# Patient Record
Sex: FEMALE | Race: BLACK OR AFRICAN AMERICAN | Employment: OTHER | URBAN - METROPOLITAN AREA
[De-identification: names, ages, dates, MRNs, and addresses within clinical notes are randomized per-mention and may not be internally consistent; named-entity substitution may affect disease eponyms.]

---

## 2017-10-24 ENCOUNTER — HOSPITAL ENCOUNTER (OUTPATIENT)
Dept: PHYSICAL THERAPY | Age: 72
Discharge: HOME | End: 2017-10-24
Payer: MEDICARE

## 2017-10-24 PROCEDURE — G8982 BODY POS GOAL STATUS: HCPCS

## 2017-10-24 PROCEDURE — 97163 PT EVAL HIGH COMPLEX 45 MIN: CPT

## 2017-10-24 PROCEDURE — G8981 BODY POS CURRENT STATUS: HCPCS
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
## 2017-10-26 NOTE — PROGRESS NOTES
Ambulatory/Rehab Services H2 Model Falls Risk Assessment    Risk Factor Pts. ·   Confusion/Disorientation/Impulsivity  []    4 ·   Symptomatic Depression  []   2 ·   Altered Elimination  []   1 ·   Dizziness/Vertigo  []   1 ·   Gender (Male)  []   1 ·   Any administered antiepileptics (anticonvulsants):  []   2 ·   Any administered benzodiazepines:  []   1 ·   Visual Impairment (specify):  []   1 ·   Portable Oxygen Use  []   1 ·   Orthostatic ? BP  []   1 ·   History of Recent Falls (within 3 mos.)  []   5     Ability to Rise from Chair (choose one) Pts. ·   Ability to rise in a single movement  []   0 ·   Pushes up, successful in one attempt  [x]   1 ·   Multiple attempts, but successful  []   3 ·   Unable to rise without assistance  []   4   Total: (5 or greater = High Risk) 1     Falls Prevention Plan:   []                Physical Limitations to Exercise (specify):   []                Mobility Assistance Device (type):   []                Exercise/Equipment Adaptation (specify):    ©2010 [facility] [address] Patent #[id number].  Federal Law prohibits the replication, distribution or use without written permission from [facility] [employer]

## 2017-10-27 ENCOUNTER — HOSPITAL ENCOUNTER (OUTPATIENT)
Dept: PHYSICAL THERAPY | Age: 72
Discharge: HOME | End: 2017-10-27
Payer: MEDICARE

## 2017-10-27 PROCEDURE — 97113 AQUATIC THERAPY/EXERCISES: CPT
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
## 2017-10-30 ENCOUNTER — HOSPITAL ENCOUNTER (OUTPATIENT)
Dept: PHYSICAL THERAPY | Age: 72
Discharge: HOME | End: 2017-10-30
Payer: MEDICARE

## 2017-10-30 PROCEDURE — 97113 AQUATIC THERAPY/EXERCISES: CPT
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
## 2017-11-01 ENCOUNTER — HOSPITAL ENCOUNTER (OUTPATIENT)
Dept: PHYSICAL THERAPY | Age: 72
Discharge: HOME | End: 2017-11-01
Payer: MEDICARE

## 2017-11-01 PROCEDURE — 97113 AQUATIC THERAPY/EXERCISES: CPT
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
## 2017-11-06 ENCOUNTER — HOSPITAL ENCOUNTER (OUTPATIENT)
Dept: PHYSICAL THERAPY | Age: 72
Discharge: HOME | End: 2017-11-06
Payer: MEDICARE

## 2017-11-06 PROCEDURE — 97113 AQUATIC THERAPY/EXERCISES: CPT
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
## 2017-11-08 ENCOUNTER — HOSPITAL ENCOUNTER (OUTPATIENT)
Dept: PHYSICAL THERAPY | Age: 72
Discharge: HOME | End: 2017-11-08
Payer: MEDICARE

## 2017-11-08 PROCEDURE — 97113 AQUATIC THERAPY/EXERCISES: CPT
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
## 2017-11-13 ENCOUNTER — HOSPITAL ENCOUNTER (OUTPATIENT)
Dept: PHYSICAL THERAPY | Age: 72
Discharge: HOME | End: 2017-11-13
Payer: MEDICARE

## 2017-11-13 PROCEDURE — 97113 AQUATIC THERAPY/EXERCISES: CPT
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
## 2017-11-15 ENCOUNTER — APPOINTMENT (OUTPATIENT)
Dept: PHYSICAL THERAPY | Age: 72
End: 2017-11-15
Payer: MEDICARE

## 2017-11-17 ENCOUNTER — HOSPITAL ENCOUNTER (OUTPATIENT)
Dept: PHYSICAL THERAPY | Age: 72
Discharge: HOME | End: 2017-11-17
Payer: MEDICARE

## 2017-11-17 PROCEDURE — 97110 THERAPEUTIC EXERCISES: CPT

## 2017-11-17 PROCEDURE — G8978 MOBILITY CURRENT STATUS: HCPCS

## 2017-11-17 PROCEDURE — G8979 MOBILITY GOAL STATUS: HCPCS
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
## 2017-11-28 ENCOUNTER — HOSPITAL ENCOUNTER (OUTPATIENT)
Dept: PHYSICAL THERAPY | Age: 72
Discharge: HOME | End: 2017-11-28
Payer: MEDICARE

## 2017-11-28 PROCEDURE — 97113 AQUATIC THERAPY/EXERCISES: CPT
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
## 2017-11-30 ENCOUNTER — HOSPITAL ENCOUNTER (OUTPATIENT)
Dept: PHYSICAL THERAPY | Age: 72
Discharge: HOME | End: 2017-11-30
Payer: MEDICARE

## 2017-11-30 PROCEDURE — 97140 MANUAL THERAPY 1/> REGIONS: CPT

## 2017-11-30 PROCEDURE — 97110 THERAPEUTIC EXERCISES: CPT
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
## 2017-12-05 ENCOUNTER — HOSPITAL ENCOUNTER (OUTPATIENT)
Dept: PHYSICAL THERAPY | Age: 72
Discharge: HOME | End: 2017-12-05
Payer: MEDICARE

## 2017-12-05 PROCEDURE — 97113 AQUATIC THERAPY/EXERCISES: CPT
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
## 2017-12-07 ENCOUNTER — HOSPITAL ENCOUNTER (OUTPATIENT)
Dept: PHYSICAL THERAPY | Age: 72
Discharge: HOME | End: 2017-12-07
Payer: MEDICARE

## 2017-12-07 PROCEDURE — 97110 THERAPEUTIC EXERCISES: CPT

## 2017-12-07 PROCEDURE — 97140 MANUAL THERAPY 1/> REGIONS: CPT
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
## 2017-12-14 ENCOUNTER — HOSPITAL ENCOUNTER (OUTPATIENT)
Dept: PHYSICAL THERAPY | Age: 72
Discharge: HOME | End: 2017-12-14
Payer: MEDICARE

## 2017-12-14 PROCEDURE — 97140 MANUAL THERAPY 1/> REGIONS: CPT

## 2017-12-14 PROCEDURE — 97110 THERAPEUTIC EXERCISES: CPT
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
## 2017-12-19 ENCOUNTER — HOSPITAL ENCOUNTER (OUTPATIENT)
Dept: PHYSICAL THERAPY | Age: 72
Discharge: HOME | End: 2017-12-19
Payer: MEDICARE

## 2017-12-19 PROCEDURE — 97110 THERAPEUTIC EXERCISES: CPT

## 2017-12-19 PROCEDURE — G8978 MOBILITY CURRENT STATUS: HCPCS

## 2017-12-19 PROCEDURE — G8979 MOBILITY GOAL STATUS: HCPCS
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
## 2017-12-26 ENCOUNTER — APPOINTMENT (OUTPATIENT)
Dept: PHYSICAL THERAPY | Age: 72
End: 2017-12-26
Payer: MEDICARE

## 2017-12-26 PROBLEM — R07.2 PRECORDIAL PAIN: Status: ACTIVE | Noted: 2017-12-26

## 2017-12-26 PROBLEM — I10 HYPERTENSION: Status: ACTIVE | Noted: 2017-12-26

## 2017-12-28 PROBLEM — E78.00 PURE HYPERCHOLESTEROLEMIA: Status: ACTIVE | Noted: 2017-12-28

## 2017-12-28 PROBLEM — R06.02 SHORTNESS OF BREATH: Status: ACTIVE | Noted: 2017-12-28

## 2018-01-02 ENCOUNTER — APPOINTMENT (OUTPATIENT)
Dept: PHYSICAL THERAPY | Age: 73
End: 2018-01-02
Payer: MEDICARE

## 2018-01-04 ENCOUNTER — HOSPITAL ENCOUNTER (OUTPATIENT)
Dept: PHYSICAL THERAPY | Age: 73
Discharge: HOME | End: 2018-01-04
Payer: MEDICARE

## 2018-01-04 PROCEDURE — 97110 THERAPEUTIC EXERCISES: CPT

## 2018-01-04 PROCEDURE — 97140 MANUAL THERAPY 1/> REGIONS: CPT
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
## 2018-01-09 ENCOUNTER — HOSPITAL ENCOUNTER (OUTPATIENT)
Dept: PHYSICAL THERAPY | Age: 73
Discharge: HOME | End: 2018-01-09
Payer: MEDICARE

## 2018-01-09 PROCEDURE — 97110 THERAPEUTIC EXERCISES: CPT
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
## 2018-01-16 ENCOUNTER — HOSPITAL ENCOUNTER (OUTPATIENT)
Dept: PHYSICAL THERAPY | Age: 73
Discharge: HOME | End: 2018-01-16
Payer: MEDICARE

## 2018-01-16 PROCEDURE — G8978 MOBILITY CURRENT STATUS: HCPCS

## 2018-01-16 PROCEDURE — G8979 MOBILITY GOAL STATUS: HCPCS

## 2018-01-16 PROCEDURE — 97110 THERAPEUTIC EXERCISES: CPT
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
## 2018-01-23 ENCOUNTER — APPOINTMENT (OUTPATIENT)
Dept: PHYSICAL THERAPY | Age: 73
End: 2018-01-23
Payer: MEDICARE

## 2019-01-06 ENCOUNTER — APPOINTMENT (OUTPATIENT)
Dept: GENERAL RADIOLOGY | Age: 74
End: 2019-01-06
Attending: EMERGENCY MEDICINE
Payer: MEDICARE

## 2019-01-06 ENCOUNTER — HOSPITAL ENCOUNTER (EMERGENCY)
Age: 74
Discharge: HOME | End: 2019-01-06
Attending: EMERGENCY MEDICINE
Payer: MEDICARE

## 2019-01-06 ENCOUNTER — APPOINTMENT (OUTPATIENT)
Dept: GENERAL RADIOLOGY | Age: 74
End: 2019-01-06
Attending: NURSE PRACTITIONER
Payer: MEDICARE

## 2019-01-06 VITALS
DIASTOLIC BLOOD PRESSURE: 75 MMHG | RESPIRATION RATE: 16 BRPM | BODY MASS INDEX: 27.23 KG/M2 | HEART RATE: 93 BPM | WEIGHT: 148 LBS | SYSTOLIC BLOOD PRESSURE: 148 MMHG | OXYGEN SATURATION: 99 % | TEMPERATURE: 98.5 F | HEIGHT: 62 IN

## 2019-01-06 DIAGNOSIS — M79.10 MYALGIA: ICD-10-CM
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
## 2019-01-06 PROCEDURE — 99283 EMERGENCY DEPT VISIT LOW MDM: CPT | Performed by: NURSE PRACTITIONER

## 2019-01-06 PROCEDURE — 74011250637 HC RX REV CODE- 250/637: Performed by: NURSE PRACTITIONER

## 2019-01-06 PROCEDURE — 72100 X-RAY EXAM L-S SPINE 2/3 VWS: CPT

## 2019-01-06 PROCEDURE — 73130 X-RAY EXAM OF HAND: CPT

## 2019-01-06 PROCEDURE — 73562 X-RAY EXAM OF KNEE 3: CPT

## 2019-01-06 PROCEDURE — 72070 X-RAY EXAM THORAC SPINE 2VWS: CPT

## 2019-01-06 RX ORDER — HYDROCODONE BITARTRATE AND ACETAMINOPHEN 5; 325 MG/1; MG/1
1 TABLET ORAL ONCE
Status: COMPLETED | OUTPATIENT
Start: 2019-01-06 | End: 2019-01-06

## 2019-01-06 RX ORDER — LIDOCAINE 50 MG/G
PATCH TOPICAL
Qty: 10 EACH | Refills: 0 | Status: SHIPPED | OUTPATIENT
Start: 2019-01-06 | End: 2019-02-15 | Stop reason: ALTCHOICE

## 2019-01-06 RX ADMIN — HYDROCODONE BITARTRATE AND ACETAMINOPHEN 1 TABLET: 5; 325 TABLET ORAL at 18:07
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
## 2019-01-06 NOTE — ED NOTES
I have reviewed discharge instructions with the patient. The patient verbalized understanding. Patient left ED via Discharge Method: ambulatory to Home with her daughter that was at bedside. Opportunity for questions and clarification provided. Patient given 1 scripts. To continue your aftercare when you leave the hospital, you may receive an automated call from our care team to check in on how you are doing. This is a free service and part of our promise to provide the best care and service to meet your aftercare needs.  If you have questions, or wish to unsubscribe from this service please call [phone]. Thank you for Choosing our [facility] Emergency Department.
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
## 2019-10-09 ENCOUNTER — HOSPITAL ENCOUNTER (OUTPATIENT)
Age: 74
Discharge: HOME | DRG: 392 | End: 2019-10-09
Attending: EMERGENCY MEDICINE | Admitting: INTERNAL MEDICINE
Payer: MEDICARE

## 2019-10-09 ENCOUNTER — APPOINTMENT (OUTPATIENT)
Dept: CT IMAGING | Age: 74
DRG: 392 | End: 2019-10-09
Attending: EMERGENCY MEDICINE
Payer: MEDICARE

## 2019-10-09 ENCOUNTER — HOSPITAL ENCOUNTER (INPATIENT)
Age: 74
LOS: 4 days | Discharge: HOME | DRG: 392 | End: 2019-10-13
Attending: FAMILY MEDICINE | Admitting: INTERNAL MEDICINE
Payer: MEDICARE

## 2019-10-09 VITALS
BODY MASS INDEX: 27.97 KG/M2 | WEIGHT: 152 LBS | OXYGEN SATURATION: 97 % | DIASTOLIC BLOOD PRESSURE: 84 MMHG | HEART RATE: 80 BPM | HEIGHT: 62 IN | TEMPERATURE: 98.7 F | RESPIRATION RATE: 18 BRPM | SYSTOLIC BLOOD PRESSURE: 183 MMHG

## 2019-10-09 DIAGNOSIS — K57.20 COLONIC DIVERTICULAR ABSCESS: ICD-10-CM

## 2019-10-09 DIAGNOSIS — K57.20 DIVERTICULITIS OF LARGE INTESTINE WITH ABSCESS WITHOUT BLEEDING: Primary | ICD-10-CM

## 2019-10-09 DIAGNOSIS — K57.92 DIVERTICULITIS: Primary | ICD-10-CM

## 2019-10-09 DIAGNOSIS — R10.31 RLQ ABDOMINAL PAIN: ICD-10-CM

## 2019-10-09 PROBLEM — K57.80 DIVERTICULITIS OF INTESTINE WITH ABSCESS: Status: ACTIVE | Noted: 2019-10-09

## 2019-10-09 LAB
ALBUMIN SERPL-MCNC: 2.9 G/DL (ref 3.2–4.6)
ALBUMIN/GLOB SERPL: 0.9 {RATIO} (ref 1.2–3.5)
ALP SERPL-CCNC: 50 U/L (ref 50–136)
ALT SERPL-CCNC: 24 U/L (ref 12–65)
ANION GAP SERPL CALC-SCNC: 4 MMOL/L (ref 7–16)
AST SERPL-CCNC: 19 U/L (ref 15–37)
BACTERIA URNS QL MICRO: 0 /HPF
BASOPHILS # BLD: 0 K/UL (ref 0–0.2)
BASOPHILS NFR BLD: 0 % (ref 0–2)
BILIRUB SERPL-MCNC: 0.2 MG/DL (ref 0.2–1.1)
BUN SERPL-MCNC: 29 MG/DL (ref 8–23)
CALCIUM SERPL-MCNC: 8.4 MG/DL (ref 8.3–10.4)
CASTS URNS QL MICRO: 0 /LPF
CHLORIDE SERPL-SCNC: 109 MMOL/L (ref 98–107)
CO2 SERPL-SCNC: 29 MMOL/L (ref 21–32)
CREAT SERPL-MCNC: 0.95 MG/DL (ref 0.6–1)
CRYSTALS URNS QL MICRO: 0 /LPF
DIFFERENTIAL METHOD BLD: ABNORMAL
EOSINOPHIL # BLD: 0.1 K/UL (ref 0–0.8)
EOSINOPHIL NFR BLD: 1 % (ref 0.5–7.8)
EPI CELLS #/AREA URNS HPF: NORMAL /HPF
ERYTHROCYTE [DISTWIDTH] IN BLOOD BY AUTOMATED COUNT: 15.5 % (ref 11.9–14.6)
GLOBULIN SER CALC-MCNC: 3.4 G/DL (ref 2.3–3.5)
GLUCOSE SERPL-MCNC: 122 MG/DL (ref 65–100)
HCT VFR BLD AUTO: 37.4 % (ref 35.8–46.3)
HGB BLD-MCNC: 11.7 G/DL (ref 11.7–15.4)
IMM GRANULOCYTES # BLD AUTO: 0 K/UL (ref 0–0.5)
IMM GRANULOCYTES NFR BLD AUTO: 0 % (ref 0–5)
LIPASE SERPL-CCNC: 140 U/L (ref 73–393)
LYMPHOCYTES # BLD: 2 K/UL (ref 0.5–4.6)
LYMPHOCYTES NFR BLD: 19 % (ref 13–44)
MCH RBC QN AUTO: 31.8 PG (ref 26.1–32.9)
MCHC RBC AUTO-ENTMCNC: 31.3 G/DL (ref 31.4–35)
MCV RBC AUTO: 101.6 FL (ref 79.6–97.8)
MONOCYTES # BLD: 1 K/UL (ref 0.1–1.3)
MONOCYTES NFR BLD: 9 % (ref 4–12)
MUCOUS THREADS URNS QL MICRO: 0 /LPF
NEUTS SEG # BLD: 7.3 K/UL (ref 1.7–8.2)
NEUTS SEG NFR BLD: 71 % (ref 43–78)
NRBC # BLD: 0 K/UL (ref 0–0.2)
PLATELET # BLD AUTO: 196 K/UL (ref 150–450)
PMV BLD AUTO: 9.3 FL (ref 9.4–12.3)
POTASSIUM SERPL-SCNC: 3.8 MMOL/L (ref 3.5–5.1)
PROT SERPL-MCNC: 6.3 G/DL (ref 6.3–8.2)
RBC # BLD AUTO: 3.68 M/UL (ref 4.05–5.2)
RBC #/AREA URNS HPF: NORMAL /HPF
SODIUM SERPL-SCNC: 142 MMOL/L (ref 136–145)
WBC # BLD AUTO: 10.3 K/UL (ref 4.3–11.1)
WBC URNS QL MICRO: NORMAL /HPF

## 2019-10-09 PROCEDURE — 74011250636 HC RX REV CODE- 250/636: Performed by: INTERNAL MEDICINE

## 2019-10-09 PROCEDURE — 80053 COMPREHEN METABOLIC PANEL: CPT

## 2019-10-09 PROCEDURE — 96375 TX/PRO/DX INJ NEW DRUG ADDON: CPT | Performed by: EMERGENCY MEDICINE

## 2019-10-09 PROCEDURE — 87040 BLOOD CULTURE FOR BACTERIA: CPT

## 2019-10-09 PROCEDURE — 74011636320 HC RX REV CODE- 636/320: Performed by: EMERGENCY MEDICINE

## 2019-10-09 PROCEDURE — 74011000258 HC RX REV CODE- 258: Performed by: INTERNAL MEDICINE

## 2019-10-09 PROCEDURE — 83690 ASSAY OF LIPASE: CPT

## 2019-10-09 PROCEDURE — 36415 COLL VENOUS BLD VENIPUNCTURE: CPT

## 2019-10-09 PROCEDURE — 85025 COMPLETE CBC W/AUTO DIFF WBC: CPT

## 2019-10-09 PROCEDURE — 96361 HYDRATE IV INFUSION ADD-ON: CPT | Performed by: EMERGENCY MEDICINE

## 2019-10-09 PROCEDURE — 65270000029 HC RM PRIVATE

## 2019-10-09 PROCEDURE — 96374 THER/PROPH/DIAG INJ IV PUSH: CPT | Performed by: EMERGENCY MEDICINE

## 2019-10-09 PROCEDURE — 74177 CT ABD & PELVIS W/CONTRAST: CPT

## 2019-10-09 PROCEDURE — 74011250637 HC RX REV CODE- 250/637: Performed by: INTERNAL MEDICINE

## 2019-10-09 PROCEDURE — 74011000258 HC RX REV CODE- 258: Performed by: EMERGENCY MEDICINE

## 2019-10-09 PROCEDURE — 99284 EMERGENCY DEPT VISIT MOD MDM: CPT | Performed by: EMERGENCY MEDICINE

## 2019-10-09 PROCEDURE — 74011250636 HC RX REV CODE- 250/636: Performed by: EMERGENCY MEDICINE

## 2019-10-09 PROCEDURE — 81015 MICROSCOPIC EXAM OF URINE: CPT

## 2019-10-09 RX ORDER — AMLODIPINE BESYLATE 10 MG/1
10 TABLET ORAL DAILY
Status: DISCONTINUED | OUTPATIENT
Start: 2019-10-10 | End: 2019-10-13 | Stop reason: HOSPADM

## 2019-10-09 RX ORDER — SODIUM CHLORIDE 9 MG/ML
75 INJECTION, SOLUTION INTRAVENOUS CONTINUOUS
Status: CANCELLED | OUTPATIENT
Start: 2019-10-09

## 2019-10-09 RX ORDER — AMLODIPINE BESYLATE 10 MG/1
10 TABLET ORAL DAILY
Status: CANCELLED | OUTPATIENT
Start: 2019-10-10

## 2019-10-09 RX ORDER — ONDANSETRON 2 MG/ML
4 INJECTION INTRAMUSCULAR; INTRAVENOUS
Status: CANCELLED | OUTPATIENT
Start: 2019-10-09

## 2019-10-09 RX ORDER — SODIUM CHLORIDE 0.9 % (FLUSH) 0.9 %
5-40 SYRINGE (ML) INJECTION AS NEEDED
Status: CANCELLED | OUTPATIENT
Start: 2019-10-09

## 2019-10-09 RX ORDER — MORPHINE SULFATE 2 MG/ML
4 INJECTION, SOLUTION INTRAMUSCULAR; INTRAVENOUS ONCE
Status: COMPLETED | OUTPATIENT
Start: 2019-10-09 | End: 2019-10-09

## 2019-10-09 RX ORDER — ONDANSETRON 2 MG/ML
4 INJECTION INTRAMUSCULAR; INTRAVENOUS
Status: DISCONTINUED | OUTPATIENT
Start: 2019-10-09 | End: 2019-10-13 | Stop reason: HOSPADM

## 2019-10-09 RX ORDER — HEPARIN SODIUM 5000 [USP'U]/ML
5000 INJECTION, SOLUTION INTRAVENOUS; SUBCUTANEOUS EVERY 8 HOURS
Status: DISCONTINUED | OUTPATIENT
Start: 2019-10-09 | End: 2019-10-13 | Stop reason: HOSPADM

## 2019-10-09 RX ORDER — SODIUM CHLORIDE 0.9 % (FLUSH) 0.9 %
5-40 SYRINGE (ML) INJECTION AS NEEDED
Status: DISCONTINUED | OUTPATIENT
Start: 2019-10-09 | End: 2019-10-13 | Stop reason: HOSPADM

## 2019-10-09 RX ORDER — PANTOPRAZOLE SODIUM 40 MG/1
40 TABLET, DELAYED RELEASE ORAL
Status: DISCONTINUED | OUTPATIENT
Start: 2019-10-10 | End: 2019-10-10

## 2019-10-09 RX ORDER — CARVEDILOL 6.25 MG/1
12.5 TABLET ORAL 2 TIMES DAILY WITH MEALS
Status: CANCELLED | OUTPATIENT
Start: 2019-10-10

## 2019-10-09 RX ORDER — METRONIDAZOLE 500 MG/100ML
500 INJECTION, SOLUTION INTRAVENOUS
Status: COMPLETED | OUTPATIENT
Start: 2019-10-09 | End: 2019-10-09

## 2019-10-09 RX ORDER — SODIUM CHLORIDE 0.9 % (FLUSH) 0.9 %
5-40 SYRINGE (ML) INJECTION EVERY 8 HOURS
Status: DISCONTINUED | OUTPATIENT
Start: 2019-10-09 | End: 2019-10-13 | Stop reason: HOSPADM

## 2019-10-09 RX ORDER — PRAVASTATIN SODIUM 20 MG/1
40 TABLET ORAL
Status: DISCONTINUED | OUTPATIENT
Start: 2019-10-09 | End: 2019-10-10

## 2019-10-09 RX ORDER — MORPHINE SULFATE 2 MG/ML
2 INJECTION, SOLUTION INTRAMUSCULAR; INTRAVENOUS ONCE
Status: COMPLETED | OUTPATIENT
Start: 2019-10-09 | End: 2019-10-09

## 2019-10-09 RX ORDER — PANTOPRAZOLE SODIUM 40 MG/1
40 TABLET, DELAYED RELEASE ORAL
Status: CANCELLED | OUTPATIENT
Start: 2019-10-10

## 2019-10-09 RX ORDER — METRONIDAZOLE 500 MG/100ML
500 INJECTION, SOLUTION INTRAVENOUS EVERY 6 HOURS
Status: DISCONTINUED | OUTPATIENT
Start: 2019-10-10 | End: 2019-10-10

## 2019-10-09 RX ORDER — SODIUM CHLORIDE 0.9 % (FLUSH) 0.9 %
5-40 SYRINGE (ML) INJECTION EVERY 8 HOURS
Status: CANCELLED | OUTPATIENT
Start: 2019-10-09

## 2019-10-09 RX ORDER — CIPROFLOXACIN 2 MG/ML
400 INJECTION, SOLUTION INTRAVENOUS ONCE
Status: COMPLETED | OUTPATIENT
Start: 2019-10-09 | End: 2019-10-09

## 2019-10-09 RX ORDER — MORPHINE SULFATE 2 MG/ML
2 INJECTION, SOLUTION INTRAMUSCULAR; INTRAVENOUS
Status: DISCONTINUED | OUTPATIENT
Start: 2019-10-09 | End: 2019-10-13 | Stop reason: HOSPADM

## 2019-10-09 RX ORDER — HEPARIN SODIUM 5000 [USP'U]/ML
5000 INJECTION, SOLUTION INTRAVENOUS; SUBCUTANEOUS EVERY 8 HOURS
Status: CANCELLED | OUTPATIENT
Start: 2019-10-09

## 2019-10-09 RX ORDER — ASPIRIN 81 MG/1
81 TABLET ORAL DAILY
Status: DISCONTINUED | OUTPATIENT
Start: 2019-10-10 | End: 2019-10-10

## 2019-10-09 RX ORDER — LEVOTHYROXINE SODIUM 100 UG/1
100 TABLET ORAL
Status: DISCONTINUED | OUTPATIENT
Start: 2019-10-10 | End: 2019-10-13 | Stop reason: HOSPADM

## 2019-10-09 RX ORDER — METRONIDAZOLE 500 MG/100ML
500 INJECTION, SOLUTION INTRAVENOUS EVERY 6 HOURS
Status: CANCELLED | OUTPATIENT
Start: 2019-10-09

## 2019-10-09 RX ORDER — HYDROCHLOROTHIAZIDE 25 MG/1
25 TABLET ORAL DAILY
Status: CANCELLED | OUTPATIENT
Start: 2019-10-10

## 2019-10-09 RX ORDER — ONDANSETRON 2 MG/ML
4 INJECTION INTRAMUSCULAR; INTRAVENOUS
Status: COMPLETED | OUTPATIENT
Start: 2019-10-09 | End: 2019-10-09

## 2019-10-09 RX ORDER — HYDROCHLOROTHIAZIDE 25 MG/1
25 TABLET ORAL DAILY
Status: DISCONTINUED | OUTPATIENT
Start: 2019-10-10 | End: 2019-10-10

## 2019-10-09 RX ORDER — ACETAMINOPHEN 325 MG/1
650 TABLET ORAL
Status: CANCELLED | OUTPATIENT
Start: 2019-10-09

## 2019-10-09 RX ORDER — CARVEDILOL 3.12 MG/1
3.12 TABLET ORAL 2 TIMES DAILY WITH MEALS
Status: DISCONTINUED | OUTPATIENT
Start: 2019-10-10 | End: 2019-10-13 | Stop reason: HOSPADM

## 2019-10-09 RX ORDER — HYDRALAZINE HYDROCHLORIDE 20 MG/ML
10 INJECTION INTRAMUSCULAR; INTRAVENOUS
Status: DISCONTINUED | OUTPATIENT
Start: 2019-10-09 | End: 2019-10-13 | Stop reason: HOSPADM

## 2019-10-09 RX ORDER — SODIUM CHLORIDE 9 MG/ML
75 INJECTION, SOLUTION INTRAVENOUS CONTINUOUS
Status: DISCONTINUED | OUTPATIENT
Start: 2019-10-09 | End: 2019-10-10

## 2019-10-09 RX ORDER — ACETAMINOPHEN 325 MG/1
650 TABLET ORAL
Status: DISCONTINUED | OUTPATIENT
Start: 2019-10-09 | End: 2019-10-13 | Stop reason: HOSPADM

## 2019-10-09 RX ORDER — SODIUM CHLORIDE 0.9 % (FLUSH) 0.9 %
10 SYRINGE (ML) INJECTION
Status: COMPLETED | OUTPATIENT
Start: 2019-10-09 | End: 2019-10-09

## 2019-10-09 RX ORDER — PRAVASTATIN SODIUM 20 MG/1
40 TABLET ORAL
Status: CANCELLED | OUTPATIENT
Start: 2019-10-09

## 2019-10-09 RX ADMIN — ACETAMINOPHEN 650 MG: 325 TABLET, FILM COATED ORAL at 23:11

## 2019-10-09 RX ADMIN — ONDANSETRON 4 MG: 2 INJECTION INTRAMUSCULAR; INTRAVENOUS at 16:27

## 2019-10-09 RX ADMIN — MORPHINE SULFATE 2 MG: 2 INJECTION, SOLUTION INTRAMUSCULAR; INTRAVENOUS at 16:29

## 2019-10-09 RX ADMIN — IOPAMIDOL 100 ML: 755 INJECTION, SOLUTION INTRAVENOUS at 17:46

## 2019-10-09 RX ADMIN — Medication 10 ML: at 23:00

## 2019-10-09 RX ADMIN — SODIUM CHLORIDE 75 ML/HR: 900 INJECTION, SOLUTION INTRAVENOUS at 23:02

## 2019-10-09 RX ADMIN — ONDANSETRON 4 MG: 2 INJECTION INTRAMUSCULAR; INTRAVENOUS at 18:51

## 2019-10-09 RX ADMIN — Medication 10 ML: at 17:46

## 2019-10-09 RX ADMIN — METRONIDAZOLE 500 MG: 500 INJECTION, SOLUTION INTRAVENOUS at 18:52

## 2019-10-09 RX ADMIN — CEFTRIAXONE 1 G: 1 INJECTION, POWDER, FOR SOLUTION INTRAMUSCULAR; INTRAVENOUS at 23:01

## 2019-10-09 RX ADMIN — SODIUM CHLORIDE 100 ML: 900 INJECTION, SOLUTION INTRAVENOUS at 17:46

## 2019-10-09 RX ADMIN — MORPHINE SULFATE 4 MG: 2 INJECTION, SOLUTION INTRAMUSCULAR; INTRAVENOUS at 18:51

## 2019-10-09 RX ADMIN — CIPROFLOXACIN 400 MG: 2 INJECTION, SOLUTION INTRAVENOUS at 20:20

## 2019-10-09 RX ADMIN — PRAVASTATIN SODIUM 40 MG: 20 TABLET ORAL at 23:01

## 2019-10-09 RX ADMIN — SODIUM CHLORIDE 1000 ML: 900 INJECTION, SOLUTION INTRAVENOUS at 16:26

## 2019-10-09 NOTE — ED TRIAGE NOTES
Patient reports stabbing pain to lower abdomen into vagina. Pain worse with urination. Denies blood in urine. Seen at , given antibiotic and fluids, per patient.

## 2019-10-09 NOTE — ED PROVIDER NOTES
79-year-old female presents with complaint of cramping/sharp right lower quadrant pain that is been present since Thursday/Friday. Patient states that she was seen at  on 10/07. Patient had basic blood work, urinalysis, and x-ray KUB. UA with 1-3 WBCs and 3+ bacteria. Patient given Rocephin in office and discharged home with antibiotic. Patient states that symptoms have not improved. Patient reports worsening pain and nausea. Patient reports mild constipation however she had a normal bowel movement earlier this morning. Patient denies diarrhea, fever, chills, melena, hematochezia, vaginal bleeding, vaginal discharge. Patient reports history of previous hysterectomy and appendectomy. The history is provided by the patient. No  was used. Abdominal Pain    This is a new problem. The current episode started more than 2 days ago. The problem occurs constantly. The problem has not changed since onset. The pain is located in the RLQ. The quality of the pain is cramping and sharp. The pain is at a severity of 4/10. The pain is mild. Associated symptoms include nausea, constipation and dysuria. Pertinent negatives include no anorexia, no fever, no belching, no diarrhea, no flatus, no hematochezia, no melena, no vomiting, no frequency, no hematuria, no headaches, no arthralgias, no myalgias, no trauma, no chest pain and no back pain. The pain is worsened by palpation. The pain is relieved by nothing.         Past Medical History:   Diagnosis Date    Arthritis     Hypercholesteremia     Hypertension     Multinodular goiter     Postsurgical hypothyroidism        Past Surgical History:   Procedure Laterality Date    HX CARPAL TUNNEL RELEASE Bilateral     HX HYSTERECTOMY      HX KNEE REPLACEMENT Bilateral     HX OOPHORECTOMY Bilateral     HX ROTATOR CUFF REPAIR Left     HX SHOULDER REPLACEMENT Right     HX THYROIDECTOMY  ~1987         Family History:   Problem Relation Age of Onset    Sudden Death Mother     Coronary Artery Disease Mother     Coronary Artery Disease Father     Coronary Artery Disease Maternal Aunt     Diabetes Maternal Aunt     Coronary Artery Disease Maternal Uncle     Diabetes Maternal Uncle     Coronary Artery Disease Maternal Grandmother     Diabetes Maternal Grandmother     Thyroid Disease Sister     Diabetes Maternal Aunt     Diabetes Maternal Aunt     Diabetes Maternal Aunt     Diabetes Maternal Uncle     Cancer Neg Hx     Thyroid Cancer Neg Hx        Social History     Socioeconomic History    Marital status:      Spouse name: Not on file    Number of children: Not on file    Years of education: Not on file    Highest education level: Not on file   Occupational History    Not on file   Social Needs    Financial resource strain: Not on file    Food insecurity:     Worry: Not on file     Inability: Not on file    Transportation needs:     Medical: Not on file     Non-medical: Not on file   Tobacco Use    Smoking status: Current Every Day Smoker     Packs/day: 0.50    Smokeless tobacco: Never Used   Substance and Sexual Activity    Alcohol use: No    Drug use: No    Sexual activity: Never   Lifestyle    Physical activity:     Days per week: Not on file     Minutes per session: Not on file    Stress: Not on file   Relationships    Social connections:     Talks on phone: Not on file     Gets together: Not on file     Attends Buddhism service: Not on file     Active member of club or organization: Not on file     Attends meetings of clubs or organizations: Not on file     Relationship status: Not on file    Intimate partner violence:     Fear of current or ex partner: Not on file     Emotionally abused: Not on file     Physically abused: Not on file     Forced sexual activity: Not on file   Other Topics Concern    Not on file   Social History Narrative    Not on file         ALLERGIES: Aspirin; Ciprofloxacin;  Influenza virus vaccines; Lisinopril; and Sulfa (sulfonamide antibiotics)    Review of Systems   Constitutional: Negative for fatigue and fever. Respiratory: Negative for shortness of breath. Cardiovascular: Negative for chest pain. Gastrointestinal: Positive for abdominal pain, constipation and nausea. Negative for anorexia, diarrhea, flatus, hematochezia, melena, rectal pain and vomiting. Genitourinary: Positive for dysuria. Negative for flank pain, frequency, hematuria, vaginal bleeding and vaginal discharge. Musculoskeletal: Negative for arthralgias, back pain and myalgias. Skin: Negative for rash. Neurological: Negative for dizziness, light-headedness and headaches. Vitals:    10/09/19 1533   BP: 153/86   Pulse: 92   Resp: 16   Temp: 97.8 °F (36.6 °C)   SpO2: 99%   Weight: 68.9 kg (152 lb)   Height: 5' 2\" (1.575 m)            Physical Exam   Constitutional: She is oriented to person, place, and time. She appears well-developed and well-nourished. Well appearing and in NAD. HENT:   Mouth/Throat: Oropharynx is clear and moist.   MMM. Eyes: Pupils are equal, round, and reactive to light. Conjunctivae are normal.   Neck: No JVD present. No tracheal deviation present. Cardiovascular: Normal rate, regular rhythm, normal heart sounds and intact distal pulses. Pulmonary/Chest: Effort normal and breath sounds normal.   Abdominal: Soft. Bowel sounds are normal. There is tenderness in the right lower quadrant. There is no rebound, no guarding and negative Pate's sign. Mild RLQ TTP. Soft, ND. No rebound or guarding. No CVAT. Musculoskeletal: Normal range of motion. Neurological: She is alert and oriented to person, place, and time. No cranial nerve deficit. Strength 5/5 throughout. Normal sensory. Skin: Skin is warm and dry. No rash. Nursing note and vitals reviewed.        MDM  Number of Diagnoses or Management Options  Colonic diverticular abscess: new and requires workup  Diverticulitis: new and requires workup  RLQ abdominal pain: new and requires workup  Diagnosis management comments: CT w/ findings of diverticulitis with focal perforation and small abscess. Orders placed for IV Cipro + Flagyl, pain control, Zofran 4 mg IV. UA negative. General Surgery consulted. Dr. Felix Tariq on call. Discussed case with him and requests Hospitalist admit and transfer DT for IR. Amount and/or Complexity of Data Reviewed  Clinical lab tests: ordered and reviewed  Tests in the radiology section of CPT®: ordered and reviewed  Tests in the medicine section of CPT®: ordered and reviewed  Review and summarize past medical records: yes  Discuss the patient with other providers: yes  Independent visualization of images, tracings, or specimens: yes    Risk of Complications, Morbidity, and/or Mortality  Presenting problems: moderate  Diagnostic procedures: moderate  Management options: moderate    Patient Progress  Patient progress: stable    ED Course as of Oct 09 1832   Wed Oct 09, 2019   1811 CT abd/pelvis IMPRESSION:     1.  Findings suspicious for an acute distal sigmoid diverticulitis probably  complicated by focal perforation and a small diverticular abscess. 2.  Other chronic findings as above.     [DF]      ED Course User Index  [DF] Daron Gardner MD       Procedures    Results Include:    Recent Results (from the past 24 hour(s))   URINE MICROSCOPIC    Collection Time: 10/09/19  3:52 PM   Result Value Ref Range    WBC 3-5 0 /hpf    RBC 0-3 0 /hpf    Epithelial cells 0-3 0 /hpf    Bacteria 0 0 /hpf    Casts 0 0 /lpf    Crystals, urine 0 0 /LPF    Mucus 0 0 /lpf   CBC WITH AUTOMATED DIFF    Collection Time: 10/09/19  4:08 PM   Result Value Ref Range    WBC 10.3 4.3 - 11.1 K/uL    RBC 3.68 (L) 4.05 - 5.2 M/uL    HGB 11.7 11.7 - 15.4 g/dL    HCT 37.4 35.8 - 46.3 %    .6 (H) 79.6 - 97.8 FL    MCH 31.8 26.1 - 32.9 PG    MCHC 31.3 (L) 31.4 - 35.0 g/dL    RDW 15.5 (H) 11.9 - 14.6 % PLATELET 156 975 - 204 K/uL    MPV 9.3 (L) 9.4 - 12.3 FL    ABSOLUTE NRBC 0.00 0.0 - 0.2 K/uL    DF AUTOMATED      NEUTROPHILS 71 43 - 78 %    LYMPHOCYTES 19 13 - 44 %    MONOCYTES 9 4.0 - 12.0 %    EOSINOPHILS 1 0.5 - 7.8 %    BASOPHILS 0 0.0 - 2.0 %    IMMATURE GRANULOCYTES 0 0.0 - 5.0 %    ABS. NEUTROPHILS 7.3 1.7 - 8.2 K/UL    ABS. LYMPHOCYTES 2.0 0.5 - 4.6 K/UL    ABS. MONOCYTES 1.0 0.1 - 1.3 K/UL    ABS. EOSINOPHILS 0.1 0.0 - 0.8 K/UL    ABS. BASOPHILS 0.0 0.0 - 0.2 K/UL    ABS. IMM. GRANS. 0.0 0.0 - 0.5 K/UL   LIPASE    Collection Time: 10/09/19  4:39 PM   Result Value Ref Range    Lipase 140 73 - 225 U/L   METABOLIC PANEL, COMPREHENSIVE    Collection Time: 10/09/19  4:39 PM   Result Value Ref Range    Sodium 142 136 - 145 mmol/L    Potassium 3.8 3.5 - 5.1 mmol/L    Chloride 109 (H) 98 - 107 mmol/L    CO2 29 21 - 32 mmol/L    Anion gap 4 (L) 7 - 16 mmol/L    Glucose 122 (H) 65 - 100 mg/dL    BUN 29 (H) 8 - 23 MG/DL    Creatinine 0.95 0.6 - 1.0 MG/DL    GFR est AA >60 >60 ml/min/1.73m2    GFR est non-AA >60 >60 ml/min/1.73m2    Calcium 8.4 8.3 - 10.4 MG/DL    Bilirubin, total 0.2 0.2 - 1.1 MG/DL    ALT (SGPT) 24 12 - 65 U/L    AST (SGOT) 19 15 - 37 U/L    Alk. phosphatase 50 50 - 136 U/L    Protein, total 6.3 6.3 - 8.2 g/dL    Albumin 2.9 (L) 3.2 - 4.6 g/dL    Globulin 3.4 2.3 - 3.5 g/dL    A-G Ratio 0.9 (L) 1.2 - 3.5           David Scott MD; 10/9/2019 @3:45 PM Voice dictation software was used during the making of this note. This software is not perfect and grammatical and other typographical errors may be present.   This note has not been proofread for errors.  ===================================================================

## 2019-10-10 LAB
ALBUMIN SERPL-MCNC: 2.9 G/DL (ref 3.2–4.6)
ALBUMIN/GLOB SERPL: 0.9 {RATIO} (ref 1.2–3.5)
ALP SERPL-CCNC: 60 U/L (ref 50–136)
ALT SERPL-CCNC: 23 U/L (ref 12–65)
ANION GAP SERPL CALC-SCNC: 2 MMOL/L (ref 7–16)
AST SERPL-CCNC: 14 U/L (ref 15–37)
BASOPHILS # BLD: 0 K/UL (ref 0–0.2)
BASOPHILS NFR BLD: 0 % (ref 0–2)
BILIRUB SERPL-MCNC: 0.4 MG/DL (ref 0.2–1.1)
BUN SERPL-MCNC: 19 MG/DL (ref 8–23)
CALCIUM SERPL-MCNC: 8.7 MG/DL (ref 8.3–10.4)
CHLORIDE SERPL-SCNC: 110 MMOL/L (ref 98–107)
CO2 SERPL-SCNC: 32 MMOL/L (ref 21–32)
CREAT SERPL-MCNC: 0.97 MG/DL (ref 0.6–1)
DIFFERENTIAL METHOD BLD: ABNORMAL
EOSINOPHIL # BLD: 0.1 K/UL (ref 0–0.8)
EOSINOPHIL NFR BLD: 1 % (ref 0.5–7.8)
ERYTHROCYTE [DISTWIDTH] IN BLOOD BY AUTOMATED COUNT: 15.4 % (ref 11.9–14.6)
GLOBULIN SER CALC-MCNC: 3.4 G/DL (ref 2.3–3.5)
GLUCOSE SERPL-MCNC: 112 MG/DL (ref 65–100)
HCT VFR BLD AUTO: 35.6 % (ref 35.8–46.3)
HGB BLD-MCNC: 11.3 G/DL (ref 11.7–15.4)
IMM GRANULOCYTES # BLD AUTO: 0 K/UL (ref 0–0.5)
IMM GRANULOCYTES NFR BLD AUTO: 0 % (ref 0–5)
LYMPHOCYTES # BLD: 1.9 K/UL (ref 0.5–4.6)
LYMPHOCYTES NFR BLD: 23 % (ref 13–44)
MCH RBC QN AUTO: 32.3 PG (ref 26.1–32.9)
MCHC RBC AUTO-ENTMCNC: 31.7 G/DL (ref 31.4–35)
MCV RBC AUTO: 101.7 FL (ref 79.6–97.8)
MONOCYTES # BLD: 0.7 K/UL (ref 0.1–1.3)
MONOCYTES NFR BLD: 9 % (ref 4–12)
NEUTS SEG # BLD: 5.5 K/UL (ref 1.7–8.2)
NEUTS SEG NFR BLD: 67 % (ref 43–78)
NRBC # BLD: 0 K/UL (ref 0–0.2)
PLATELET # BLD AUTO: 189 K/UL (ref 150–450)
PMV BLD AUTO: 9.3 FL (ref 9.4–12.3)
POTASSIUM SERPL-SCNC: 4.2 MMOL/L (ref 3.5–5.1)
PROT SERPL-MCNC: 6.3 G/DL (ref 6.3–8.2)
RBC # BLD AUTO: 3.5 M/UL (ref 4.05–5.2)
SODIUM SERPL-SCNC: 144 MMOL/L (ref 136–145)
WBC # BLD AUTO: 8.2 K/UL (ref 4.3–11.1)

## 2019-10-10 PROCEDURE — 97165 OT EVAL LOW COMPLEX 30 MIN: CPT

## 2019-10-10 PROCEDURE — 74011250636 HC RX REV CODE- 250/636: Performed by: INTERNAL MEDICINE

## 2019-10-10 PROCEDURE — 85025 COMPLETE CBC W/AUTO DIFF WBC: CPT

## 2019-10-10 PROCEDURE — 97530 THERAPEUTIC ACTIVITIES: CPT

## 2019-10-10 PROCEDURE — 77030020263 HC SOL INJ SOD CL0.9% LFCR 1000ML

## 2019-10-10 PROCEDURE — 74011000258 HC RX REV CODE- 258: Performed by: INTERNAL MEDICINE

## 2019-10-10 PROCEDURE — 74011000250 HC RX REV CODE- 250: Performed by: FAMILY MEDICINE

## 2019-10-10 PROCEDURE — 36415 COLL VENOUS BLD VENIPUNCTURE: CPT

## 2019-10-10 PROCEDURE — 65270000029 HC RM PRIVATE

## 2019-10-10 PROCEDURE — 74011250636 HC RX REV CODE- 250/636: Performed by: FAMILY MEDICINE

## 2019-10-10 PROCEDURE — 74011250637 HC RX REV CODE- 250/637: Performed by: INTERNAL MEDICINE

## 2019-10-10 PROCEDURE — 80053 COMPREHEN METABOLIC PANEL: CPT

## 2019-10-10 RX ORDER — POTASSIUM CHLORIDE, DEXTROSE MONOHYDRATE AND SODIUM CHLORIDE 300; 5; 900 MG/100ML; G/100ML; MG/100ML
INJECTION, SOLUTION INTRAVENOUS CONTINUOUS
Status: DISCONTINUED | OUTPATIENT
Start: 2019-10-10 | End: 2019-10-11

## 2019-10-10 RX ADMIN — CARVEDILOL 3.12 MG: 3.12 TABLET, FILM COATED ORAL at 09:22

## 2019-10-10 RX ADMIN — DEXTROSE MONOHYDRATE, SODIUM CHLORIDE, AND POTASSIUM CHLORIDE: 50; 9; 2.98 INJECTION, SOLUTION INTRAVENOUS at 09:43

## 2019-10-10 RX ADMIN — PANTOPRAZOLE SODIUM 40 MG: 40 TABLET, DELAYED RELEASE ORAL at 05:32

## 2019-10-10 RX ADMIN — MORPHINE SULFATE 2 MG: 2 INJECTION, SOLUTION INTRAMUSCULAR; INTRAVENOUS at 09:46

## 2019-10-10 RX ADMIN — Medication 10 ML: at 13:32

## 2019-10-10 RX ADMIN — METRONIDAZOLE 500 MG: 500 INJECTION, SOLUTION INTRAVENOUS at 05:33

## 2019-10-10 RX ADMIN — PIPERACILLIN AND TAZOBACTAM 3.38 G: 3; .375 INJECTION, POWDER, FOR SOLUTION INTRAVENOUS at 09:23

## 2019-10-10 RX ADMIN — PIPERACILLIN AND TAZOBACTAM 3.38 G: 3; .375 INJECTION, POWDER, FOR SOLUTION INTRAVENOUS at 17:23

## 2019-10-10 RX ADMIN — MORPHINE SULFATE 2 MG: 2 INJECTION, SOLUTION INTRAMUSCULAR; INTRAVENOUS at 13:32

## 2019-10-10 RX ADMIN — CARVEDILOL 3.12 MG: 3.12 TABLET, FILM COATED ORAL at 17:23

## 2019-10-10 RX ADMIN — AMLODIPINE BESYLATE 10 MG: 10 TABLET ORAL at 09:21

## 2019-10-10 RX ADMIN — METRONIDAZOLE 500 MG: 500 INJECTION, SOLUTION INTRAVENOUS at 00:31

## 2019-10-10 RX ADMIN — FAMOTIDINE 20 MG: 10 INJECTION INTRAVENOUS at 09:21

## 2019-10-10 RX ADMIN — LEVOTHYROXINE SODIUM 100 MCG: 100 TABLET ORAL at 05:33

## 2019-10-10 RX ADMIN — ACETAMINOPHEN 650 MG: 325 TABLET, FILM COATED ORAL at 04:21

## 2019-10-10 NOTE — MED STUDENT NOTES
Subjective: 
Mrs. Dawna Bates is a 76 yr old female who presented to the ED at [facility] on 10/9/2019 with sharp and cramping RLQ abdominal pain for approximately 1 week. Patient reported she went to an urgent care () last week when the abdominal pain started and was told she had a possible bladder infection, but was not given antibiotics. She stated her symptoms continued to worsen and therefore she went to the ED. CT completed in the ED showed acute distal sigmoid diverticulitis probably complicated by focal perforation and a 2.1 x 5.2cm small diverticular abscess. Surgery was consulted, but didn't feel surgery was necessary, Mrs. Dawna Bates was transferred to [facility] for interventional radiology consultation for drainage. Today, Mrs. Dawna Bates appears in moderate distress. She states her abdominal pain is still localized to the RLQ and is rated a 9/10. She is asking to eat and drink, but we discussed the need for bowel rest with diverticulitis and she stated understanding. She appeared concerned and anxious about understanding how to avoid this situation in the future. Her daughter was given updates over the phone while Dr. Tereso Edwards was in the room. Daughter stated understanding and agreement with treatment plan.  
  
PMH: HTN, hypercholesterolemia, multinodular goiter, hypothyroidism PSH: bilateral carpal tunnel release, hysterectomy, bilateral knee replacement, bilateral oophorectomy, left rotator cuff repair, right shoulder replacement  
  
ROS:  
Positive for cockily abdominal pain, diffuse joint pain, increased urinary frequency Negative for nausea, vomiting, diarrhea, constipation, dysuria, dizziness, weakness, fever, chills, numbness, tingling  
  
Objective: 
Vitals: Temp 98.2, Pulse 65, /72, Resp 18, O2 96% on room air 
  
Physical Exam: 
GEN: WDWN female in moderate distress C/V: RRR no murmurs, rubs, gallops Pulm: CTAB, no wheezes, rhonchi, rales ABD: soft, tender to palpation, no ecchymosis, normal bowel sounds, right abdomen distended SKIN: atraumatic, no ecchymosis or bruising, no jaundice EYE: no scleral icterus EXT: Radial and dorsalis pedis pulses are +3 bilaterally 
  
Assessment/Plan: 
1) Acute distal sigmoid diverticulitis probably complicated by focal perforation and a 2.1 x 5.2cm small diverticular abscess A) Interventional radiology was consulted and reviewed images. Due to the position of the abscess a drain cannot be  placed. B) Pt is NPO with allowed ice chips C) IV Zosyn x3 days D) Morphine 2mg Q4H PRN ordered for pain control 2) Hypothyroidism A) Continue Synthroid 100 mcg PO once daily 3) HTN 
 A) Continue carvedilol 3.125 mg PO BID B) Hydralazine ordered for systolic >388 *ATTENTION:  This note has been created by a medical student for educational purposes only. Please do not refer to the content of this note for clinical decision-making, billing, or other purposes. Please see attending physicians note to obtain clinical information on this patient. *

## 2019-10-10 NOTE — PROGRESS NOTES
Call from Ernesto Hardwick in [address] [place] [address], she reports abscess is not large enough for drain to be placed. Will inform Dr. Zaida Lowe.

## 2019-10-10 NOTE — PROGRESS NOTES
Dr. Claudia Rosado at nurses station, informed of call from Yasmin Jernigan in IR reporting abscess is not large enough for drain placement. No new orders received.

## 2019-10-10 NOTE — PROGRESS NOTES
Progress Note    Patient: Kris Mendez MRN: [id number]  SSN: [id number]    YOB: 1945  Age: 76 y.o. Sex: female      Admit Date: 10/9/2019    LOS: 1 day     Subjective:   She was found in distress due to abdominal pain. I spoke via conference call with her daughter Ms. Kathleen Olivares and all questions were answered. The patient was evaluated by general surgery. No surgical intervention. IR said there is no indication for aspiration, since her abscess is small. Plan to continue iv antibiotics. Objective:     Vitals:    10/09/19 2311 10/09/19 2338 10/10/19 0342 10/10/19 0749   BP: 149/78 130/67 154/82 145/77   Pulse: 73 64 63 64   Resp: 18 16 18 18   Temp:  98.3 °F (36.8 °C) 97.8 °F (36.6 °C) 98.5 °F (36.9 °C)   SpO2: 92% 95% 95% 95%        Intake and Output:  Current Shift: No intake/output data recorded. Last three shifts: 10/08 1901 - 10/10 0700  In: -   Out: 325 [Urine:325]    Physical Exam:   GENERAL: alert, cooperative, no distress, appears stated age  EYE: negative  LYMPHATIC: Cervical, supraclavicular, and axillary nodes normal.   THROAT & NECK: normal and no erythema or exudates noted. LUNG: clear to auscultation bilaterally  HEART: regular rate and rhythm, S1, S2 normal, no murmur, click, rub or gallop  ABDOMEN: soft, tenderness to touch over lower abdomen, right side more than left. Bowel sounds normal. No masses,  no organomegaly. No rebound   EXTREMITIES:  extremities normal, atraumatic, no cyanosis or edema  SKIN: Normal.  NEUROLOGIC: negative  PSYCHIATRIC: non focal    Lab/Data Review: All lab results for the last 24 hours reviewed.      Assessment:     Principal Problem:    Diverticulitis of intestine with abscess (10/9/2019)    Active Problems:    Hypertension (12/26/2017)      Pure hypercholesterolemia (12/28/2017)      Multinodular goiter ()        Plan:   -Acute sigmoid diverticulitis with microperforation + pelvic abscess:  Still with abdominal pain  No surgical indication  IR is not planning aspiration in view of the abscess size   Keep NPO  IVFs  Continue medical management with iv antibiotics  Pain control   Daily labs  Appreciate General surgery input     -HTN  controlled  carvedilol and amlodipine  Add hydralazine PRN     DVT ppx: heparin    Code status: Full     Disposition: home once medically ready     Signed By: Dave Acosta MD     October 10, 2019

## 2019-10-10 NOTE — H&P
Hospitalist H&P/Consult Note     Admit Date:  No admission date for patient encounter. Name:  Maliha Silva   Age:  76 y.o.  :  1945   MRN:  [id number]   PCP:  Henry Villareal MD  Treatment Team: Attending Provider: Charmayne Living, MD    HPI:   76years old F with PMH of HTN, hypothyroidism presented to the hospital complaining of abdominal pain since last . Patient reported pain is located her lower abdomen, above suprapubic area, on and off, crampy in nature, radiated to the RLQ. Patient went to  when she was diagnosed with \"bladder infection\", patient received antibiotics and was discharged home. At home patient did not have any improvement of the symptoms. Patient reported having some nausea, patient denies vomiting or diarrhea. Patient reported having subjective fevers at home. Patient stated \"I'm due for my first colonoscopy next year\". In the ED CT AP showing \"acute distal sigmoid diverticulitis probably  complicated by focal perforation and a small diverticular abscess\". ED physician discussed with Surgery who recommended transfer to [facility] for IR evaluation. 10 systems reviewed and negative except as noted in HPI. Past Medical History:   Diagnosis Date    Arthritis     Hypercholesteremia     Hypertension     Multinodular goiter     Postsurgical hypothyroidism       Past Surgical History:   Procedure Laterality Date    HX CARPAL TUNNEL RELEASE Bilateral     HX HYSTERECTOMY      HX KNEE REPLACEMENT Bilateral     HX OOPHORECTOMY Bilateral     HX ROTATOR CUFF REPAIR Left     HX SHOULDER REPLACEMENT Right     HX THYROIDECTOMY  ~      Cannot display prior to admission medications because the patient has not been admitted in this contact.      Allergies   Allergen Reactions    Aspirin Nausea and Vomiting    Ciprofloxacin Nausea Only    Influenza Virus Vaccines Other (comments)    Lisinopril Swelling     Tongue swelling    Sulfa (Sulfonamide Antibiotics) Shortness of Breath      Social History     Tobacco Use    Smoking status: Current Every Day Smoker     Packs/day: 0.50    Smokeless tobacco: Never Used   Substance Use Topics    Alcohol use: No      Family History   Problem Relation Age of Onset    Sudden Death Mother     Coronary Artery Disease Mother     Coronary Artery Disease Father     Coronary Artery Disease Maternal Aunt     Diabetes Maternal Aunt     Coronary Artery Disease Maternal Uncle     Diabetes Maternal Uncle     Coronary Artery Disease Maternal Grandmother     Diabetes Maternal Grandmother     Thyroid Disease Sister     Diabetes Maternal Aunt     Diabetes Maternal Aunt     Diabetes Maternal Aunt     Diabetes Maternal Uncle     Cancer Neg Hx     Thyroid Cancer Neg Hx         There is no immunization history on file for this patient. Objective:   No data found. Intake/Output Summary (Last 24 hours) at 10/9/2019 2031  Last data filed at 10/9/2019 2000  Gross per 24 hour   Intake 1200 ml   Output    Net 1200 ml       Physical Exam:  General:    Well nourished. Alert. Eyes:   Normal sclera. ENT:  Normocephalic. Moist mucous membranes  CV:   RRR. No murmur, rub, or gallop. Lungs:  CTAB. No wheezing, rhonchi, or rales. Abdomen: Soft. Tenderness mid lower abdomen. (+) guarding. (+) BS  Extremities: Warm and dry. No cyanosis or edema. Neurologic: CN II-XII grossly intact. No gross focal deficits  Skin:     No rashes or jaundice. No wounds. Psych:  Normal mood and affect. I reviewed the labs  and other studies done this admission.   Data Review:   Recent Results (from the past 24 hour(s))   URINE MICROSCOPIC    Collection Time: 10/09/19  3:52 PM   Result Value Ref Range    WBC 3-5 0 /hpf    RBC 0-3 0 /hpf    Epithelial cells 0-3 0 /hpf    Bacteria 0 0 /hpf    Casts 0 0 /lpf    Crystals, urine 0 0 /LPF    Mucus 0 0 /lpf   CBC WITH AUTOMATED DIFF    Collection Time: 10/09/19  4:08 PM   Result Value Ref Range    WBC 10.3 4.3 - 11.1 K/uL    RBC 3.68 (L) 4.05 - 5.2 M/uL    HGB 11.7 11.7 - 15.4 g/dL    HCT 37.4 35.8 - 46.3 %    .6 (H) 79.6 - 97.8 FL    MCH 31.8 26.1 - 32.9 PG    MCHC 31.3 (L) 31.4 - 35.0 g/dL    RDW 15.5 (H) 11.9 - 14.6 %    PLATELET 744 917 - 895 K/uL    MPV 9.3 (L) 9.4 - 12.3 FL    ABSOLUTE NRBC 0.00 0.0 - 0.2 K/uL    DF AUTOMATED      NEUTROPHILS 71 43 - 78 %    LYMPHOCYTES 19 13 - 44 %    MONOCYTES 9 4.0 - 12.0 %    EOSINOPHILS 1 0.5 - 7.8 %    BASOPHILS 0 0.0 - 2.0 %    IMMATURE GRANULOCYTES 0 0.0 - 5.0 %    ABS. NEUTROPHILS 7.3 1.7 - 8.2 K/UL    ABS. LYMPHOCYTES 2.0 0.5 - 4.6 K/UL    ABS. MONOCYTES 1.0 0.1 - 1.3 K/UL    ABS. EOSINOPHILS 0.1 0.0 - 0.8 K/UL    ABS. BASOPHILS 0.0 0.0 - 0.2 K/UL    ABS. IMM. GRANS. 0.0 0.0 - 0.5 K/UL   LIPASE    Collection Time: 10/09/19  4:39 PM   Result Value Ref Range    Lipase 140 73 - 531 U/L   METABOLIC PANEL, COMPREHENSIVE    Collection Time: 10/09/19  4:39 PM   Result Value Ref Range    Sodium 142 136 - 145 mmol/L    Potassium 3.8 3.5 - 5.1 mmol/L    Chloride 109 (H) 98 - 107 mmol/L    CO2 29 21 - 32 mmol/L    Anion gap 4 (L) 7 - 16 mmol/L    Glucose 122 (H) 65 - 100 mg/dL    BUN 29 (H) 8 - 23 MG/DL    Creatinine 0.95 0.6 - 1.0 MG/DL    GFR est AA >60 >60 ml/min/1.73m2    GFR est non-AA >60 >60 ml/min/1.73m2    Calcium 8.4 8.3 - 10.4 MG/DL    Bilirubin, total 0.2 0.2 - 1.1 MG/DL    ALT (SGPT) 24 12 - 65 U/L    AST (SGOT) 19 15 - 37 U/L    Alk. phosphatase 50 50 - 136 U/L    Protein, total 6.3 6.3 - 8.2 g/dL    Albumin 2.9 (L) 3.2 - 4.6 g/dL    Globulin 3.4 2.3 - 3.5 g/dL    A-G Ratio 0.9 (L) 1.2 - 3.5         Imaging Studies:  CXR Results  (Last 48 hours)    None        CT Results  (Last 48 hours)               10/09/19 1753  CT ABD PELV W CONT Final result    Impression:  IMPRESSION:        1. Findings suspicious for an acute distal sigmoid diverticulitis probably   complicated by focal perforation and a small diverticular abscess.    2.  Other chronic findings as above. Narrative:  CT ABDOMEN AND PELVIS WITH CONTRAST       HISTORY: RLQ pain, 76 years Female  Patient reports stabbing pain to lower   abdomen into vagina. Pain worse with urination x 3 days       COMPARISON: CT abdomen April 03, 2008       TECHNIQUE: Oral contrast was not administered. 100 cc of nonionic intravenous   contrast was injected, and axial helical CT images were obtained from above the   diaphragm through the pelvis. Coronal reformatted images were obtained at the   scanner console and made available for review. All CT scans at this location   are performed using dose modulation techniques as appropriate to a performed   exam including the following: automated exposure control; adjustment of the mA   and/or kV according to patient's size (this includes techniques or standardized   protocols for targeted exams where dose is matched to indication/reason for   exam; i.e. extremities or head); use of iterative reconstruction technique. FINDINGS:       ABDOMEN:   Minimal dependent subsegmental atelectasis bilateral lung bases. Normal-appearing liver, gallbladder, spleen, pancreas, bilateral adrenal glands   and kidneys. Mild calcific atherosclerosis of a normal caliber abdominal aorta. No evidence of significant lymphadenopathy. Normal-appearing small bowel. No   evidence of intraperitoneal free air or free fluid. PELVIS:   Normal-appearing urinary bladder. Patient is status post hysterectomy. There   appears to be mild inflammatory fat stranding associated with the distal colon   with diverticulosis, with a small wall enhancing apparently extraluminal fluid   collection adjacent to the area of inflammation measuring approximately 2.1 x   5.2 cm, suspicious for a small diverticular abscess, new since the prior study. Normal-appearing proximal colon. No evidence of pelvic free fluid.   No evidence   of significant inguinal or pelvic sidewall lymphadenopathy. Visualized osseous   structures unremarkable. Assessment and Plan:     Hospital Problems as of 10/9/2019 Date Reviewed: 1/29/2018          Codes Class Noted - Resolved POA    * (Principal) Diverticulitis of intestine with abscess ICD-10-CM: K57.80  ICD-9-CM: 562.11, 569.5  10/9/2019 - Present Yes        Multinodular goiter ICD-10-CM: E04.2  ICD-9-CM: 241.1  Unknown - Present Yes        Pure hypercholesterolemia ICD-10-CM: E78.00  ICD-9-CM: 272.0  12/28/2017 - Present Yes        Hypertension ICD-10-CM: I10  ICD-9-CM: 401.9  12/26/2017 - Present Yes              AP:    -Diverticulitis with abscess and microperforation. Hospitalist Dr. Miguel Magaña discussed with Dr. Sharon Olivas who stated this is a microperforation  Patient is afebrile and normal WBC count    Plan  Start ceftriaxone and flagyl IV  Send blood cultures  Keep patient NPO  Hydration with NS  IR evaluation for abscess drainage- follow up cultures  Surgery evaluation    -HTN  Uncontrolled  Restart HCTZ, carvedilol and amlodipine  Add hydralazine PRN    Restart home medications    DVT ppx: heparin  Code status: Full    Case also was discussed with patient and daughter over the phone. Signed:   Katelynn Ocampo MD

## 2019-10-10 NOTE — PROGRESS NOTES
PT note:    Pt seen ambulating hallway independent with good balance noted and managing IV pole. Discussed with pt and she feels she's at baseline. Encouraged her to continue ambulating in hallway to prevent decline in function. Pt verbalized understanding.     Thanks,  Albertina Hu, LISETT

## 2019-10-10 NOTE — PROGRESS NOTES
offered active listening and sense of connection and care with Pt. Pt expressed that she would love to get done what they want done or be able to eat something. Pt requested prayer for continued healing.  offered prayer. No further needs at this time. Please continue to consult Spiritual Care as needed. [facility], [address] [place] [address].

## 2019-10-10 NOTE — PROGRESS NOTES
Interdisciplinary Rounds completed 10/10/19. Nursing, Case Management, Physician and PT present. Plan of care reviewed and updated. Surgery, IR following. PT/OT ordered.

## 2019-10-10 NOTE — PROGRESS NOTES
Complete functional mobility for ADL with modified independence/independently in 1 visit. Met. OCCUPATIONAL THERAPY: Initial Assessment, Daily Note and Discharge 10/10/2019  INPATIENT: OT Visit Days: 1  [place] / [place] / Product Type: Medicare /      NAME/AGE/GENDER: Maria R Stephens is a 76 y.o. female   PRIMARY DIAGNOSIS:  Diverticulitis of intestine with abscess [K57.80] Diverticulitis of intestine with abscess   Diverticulitis of intestine with abscess          ICD-10: Treatment Diagnosis:    Generalized Muscle Weakness (M62.81)   Precautions/Allergies:     Aspirin; Ciprofloxacin; Influenza virus vaccines; Lisinopril; and Sulfa (sulfonamide antibiotics)      ASSESSMENT:     Ms. Shree Rick presents for the above diagnosis. She is at her functional baseline in terms of ADL/instrumental ADL and functional mobility for ADL. RN is okay with her getting up and walking on her own/with her daughter. No further need for acute OT. This section established at most recent assessment     TREATMENT PLAN: Frequency/Duration: Follow patient for today's assessment/treatment only     REHAB RECOMMENDATIONS (at time of discharge pending progress):    Placement: It is my opinion, based on this patient's performance to date, that Ms. Zuniga may benefit from being discharged with NO further skilled therapy due to the high likelihood of returning to baseline. Equipment:   None at this time              OCCUPATIONAL PROFILE AND HISTORY:   History of Present Injury/Illness (Reason for Referral):  See H & P. Past Medical History/Comorbidities:   Ms. Shree Rick  has a past medical history of Arthritis, Hypercholesteremia, Hypertension, Multinodular goiter, and Postsurgical hypothyroidism.  She also has no past medical history of Autoimmune disease ([address] COPD, Dementia, Gastrointestinal disorder, Heart failure ([address] Infectious disease, Liver disease, Neurological disorder, Psychiatric disorder, PUD (peptic ulcer disease), Seizures ([facility] Sleep disorder, or Thromboembolus ([facility] [address] [facility] Ms. Nilo Yates  has a past surgical history that includes hx hysterectomy; hx thyroidectomy (~1987); hx carpal tunnel release (Bilateral); hx shoulder replacement (Right); hx knee replacement (Bilateral); hx rotator cuff repair (Left); and hx oophorectomy (Bilateral). Social History/Living Environment:   Home Environment: Private residence  One/Two Story Residence: One story  Living Alone: No  Support Systems: Family member(s)  Patient Expects to be Discharged to[de-identified] Private residence  Current DME Used/Available at Home: None  Prior Level of Function/Work/Activity:  Independent with all. Lives with daughter.     Number of Personal Factors/Comorbidities that affect the Plan of Care: Brief history (0):  LOW COMPLEXITY   ASSESSMENT OF OCCUPATIONAL PERFORMANCE[de-identified]   Activities of Daily Living:   Basic ADLs (From Assessment) Complex ADLs (From Assessment)   Feeding: Independent  Oral Facial Hygiene/Grooming: Independent  Bathing: Independent  Upper Body Dressing: Independent  Lower Body Dressing: Independent  Toileting: Independent Instrumental ADL  Meal Preparation: Modified independent  Homemaking: Modified independent   Grooming/Bathing/Dressing Activities of Daily Living                           Lower Body Dressing Assistance  Socks: Modified independent Bed/Mat Mobility  Rolling: Independent  Supine to Sit: Independent  Sit to Supine: Independent  Sit to Stand: Independent  Stand to Sit: Independent  Scooting: Independent     Most Recent Physical Functioning:   Gross Assessment:                  Posture:     Balance:  Sitting: Intact  Standing: Intact Bed Mobility:  Rolling: Independent  Supine to Sit: Independent  Sit to Supine: Independent  Scooting: Independent  Wheelchair Mobility:     Transfers:  Sit to Stand: Independent  Stand to Sit: Independent            Patient Vitals for the past 6 hrs:   BP SpO2 Pulse   10/10/19 1210 137/72 96 % 65   10/10/19 1500 -- 95 % 80                                  Physical Skills Involved:  Strength Cognitive Skills Affected (resulting in the inability to perform in a timely and safe manner):  None noted  Psychosocial Skills Affected:  None noted    Number of elements that affect the Plan of Care: 1-3:  LOW COMPLEXITY   CLINICAL DECISION MAKIN[address] [place] [address] AM-PAC 6 Clicks   Daily Activity Inpatient Short Form  How much help from another person does the patient currently need. .. Total A Lot A Little None   1. Putting on and taking off regular lower body clothing? ? 1   ? 2   ? 3   ? 4   2. Bathing (including washing, rinsing, drying)? ? 1   ? 2   ? 3   ? 4   3. Toileting, which includes using toilet, bedpan or urinal?   ? 1   ? 2   ? 3   ? 4   4. Putting on and taking off regular upper body clothing? ? 1   ? 2   ? 3   ? 4   5. Taking care of personal grooming such as brushing teeth? ? 1   ? 2   ? 3   ? 4   6. Eating meals? ? 1   ? 2   ? 3   ? 4   © , Trustees of [address] [place] [address], under license to [employer]. All rights reserved      Score:  Initial: 24 Most Recent: X (Date: -- )    Interpretation of Tool:  Represents activities that are increasingly more difficult (i.e. Bed mobility, Transfers, Gait). Medical Necessity:     Today's assessment/treatment only. Reason for Services/Other Comments:  N/A. Use of outcome tool(s) and clinical judgement create a POC that gives a: LOW COMPLEXITY         TREATMENT:   (In addition to Assessment/Re-Assessment sessions the following treatments were rendered)     Pre-treatment Symptoms/Complaints:    Pain: Initial:   Pain Intensity 1: (no complaints of pain )  Post Session:  same     Therapeutic Activity: (    8 minutes): Therapeutic activities including functional mobility for ADL with independence  to improve mobility, strength and balance. Required no   to promote static and dynamic balance in standing. Braces/Orthotics/Lines/Etc:   IV     Treatment/Session Assessment:    Response to Treatment:  Tolerated well without complications. Interdisciplinary Collaboration:   Occupational Therapist  Registered Nurse  After treatment position/precautions:   Up in chair  Bed/Chair-wheels locked  Call light within reach  RN notified  Family at bedside   Compliance with Program/Exercises: compliant today . Recommendations/Intent for next treatment session: \"Next visit will focus on N/A \".   Total Treatment Duration:  OT Patient Time In/Time Out  Time In: 1522  Time Out: [address], OTR/L

## 2019-10-10 NOTE — PROGRESS NOTES
END OF SHIFT NOTE:    INTAKE/OUTPUT  10/09 0701 - 10/10 0700  In: -   Out: 325 [Urine:325]  Voiding: YES  Catheter: NO  Color: clear  Drain:              DIET  NPO with occasional ice chips    Flatus: Patient does have flatus present. Stool:  0 occurrences. Characteristics:       Ambulating  Yes    Emesis: 0 occurrences. Characteristics:          VITAL SIGNS  Patient Vitals for the past 12 hrs:   Temp Pulse Resp BP SpO2   10/10/19 1816  75  152/76    10/10/19 1720  75  167/77    10/10/19 1713 98.1 °F (36.7 °C) 76 18 (!) 161/91 96 %   10/10/19 1500  80   95 %   10/10/19 1210 98.2 °F (36.8 °C) 65 18 137/72 96 %   10/10/19 0749 98.5 °F (36.9 °C) 64 18 145/77 95 %       Pain Assessment  Pain Intensity 1: (no complaints of pain ) (10/10/19 1500)  Pain Location 1: Abdomen  Pain Intervention(s) 1: Medication (see MAR)  Patient Stated Pain Goal: 0      Hourly rounds completed this shift, will give report to oncoming nurse.       Idania Mcleod RN

## 2019-10-10 NOTE — CONSULTS
H&P/Consult Note/Progress Note/Office Note:   Olya Rutherford  MRN: [id number]  XIX:[birth year]  Age:74 y.o.    HPI: Olya Rutherford is a 76 y.o. female who we are asked by Dr. Nancy Castellanos to see for diverticulitis with microperforation. The patient has a PMHx of HTN and hypothyroidism. She presented the ER with complaints of abdominal pain that started on 10/6/19. She was seen at [address] [place] [address] and was told she had a bladder infection and was given antibiotics. Her symptoms persisted so she came to the ER. She reports associated nausea and subjective fevers. She denies vomiting, melena, diarrhea, or chills. She has never had a colonoscopy. CTAP in the ER showed distal sigmoid diverticulitis probably complicated by focal perforation and a small diverticular abscess measuring 2.1x5.2cm. Labs were unremarkable with a WBC of 8.2.       Past Medical History:   Diagnosis Date    Arthritis     Hypercholesteremia     Hypertension     Multinodular goiter     Postsurgical hypothyroidism      Past Surgical History:   Procedure Laterality Date    HX CARPAL TUNNEL RELEASE Bilateral     HX HYSTERECTOMY      HX KNEE REPLACEMENT Bilateral     HX OOPHORECTOMY Bilateral     HX ROTATOR CUFF REPAIR Left     HX SHOULDER REPLACEMENT Right     HX THYROIDECTOMY  ~1987     Current Facility-Administered Medications   Medication Dose Route Frequency    famotidine (PF) (PEPCID) 20 mg in sodium chloride 0.9% 10 mL injection  20 mg IntraVENous Q12H    piperacillin-tazobactam (ZOSYN) 3.375 g in 0.9% sodium chloride (MBP/ADV) 100 mL  3.375 g IntraVENous Q8H    dextrose 5% - 0.9% NaCl with KCl 40 mEq/L infusion   IntraVENous CONTINUOUS    amLODIPine (NORVASC) tablet 10 mg  10 mg Oral DAILY    carvedilol (COREG) tablet 3.125 mg  3.125 mg Oral BID WITH MEALS    levothyroxine (SYNTHROID) tablet 100 mcg  100 mcg Oral 6am    hydrALAZINE (APRESOLINE) 20 mg/mL injection 10 mg  10 mg IntraVENous Q6H PRN    acetaminophen (TYLENOL) tablet 650 mg  650 mg Oral Q4H PRN    ondansetron (ZOFRAN) injection 4 mg  4 mg IntraVENous Q4H PRN    sodium chloride (NS) flush 5-40 mL  5-40 mL IntraVENous Q8H    sodium chloride (NS) flush 5-40 mL  5-40 mL IntraVENous PRN    [Held by provider] heparin (porcine) injection 5,000 Units  5,000 Units SubCUTAneous Q8H    morphine injection 2 mg  2 mg IntraVENous Q4H PRN     Aspirin; Ciprofloxacin; Influenza virus vaccines; Lisinopril; and Sulfa (sulfonamide antibiotics)  [unfilled]  Social History     Tobacco Use   Smoking Status Current Every Day Smoker    Packs/day: 0.50   Smokeless Tobacco Never Used     Family History   Problem Relation Age of Onset    Sudden Death Mother     Coronary Artery Disease Mother     Coronary Artery Disease Father     Coronary Artery Disease Maternal Aunt     Diabetes Maternal Aunt     Coronary Artery Disease Maternal Uncle     Diabetes Maternal Uncle     Coronary Artery Disease Maternal Grandmother     Diabetes Maternal Grandmother     Thyroid Disease Sister     Diabetes Maternal Aunt     Diabetes Maternal Aunt     Diabetes Maternal Aunt     Diabetes Maternal Uncle     Cancer Neg Hx     Thyroid Cancer Neg Hx      ROS: The patient has no difficulty with chest pain or shortness of breath. No fever or chills. Comprehensive review of systems was otherwise unremarkable except as noted above. Physical Exam:   Visit Vitals  /77   Pulse 64   Temp 98.5 °F (36.9 °C)   Resp 18   SpO2 95%     Constitutional: Alert, oriented, cooperative patient in no acute distress; appears stated age    Eyes:Sclera are clear. EOMs intact  ENMT: no external lesions gross hearing normal; no obvious neck masses, no ear or lip lesions, nares normal  CV: RRR. Normal perfusion  Resp: No JVD. Breathing is  non-labored; no audible wheezing. GI: soft and non-distended     Musculoskeletal: unremarkable with normal function. No embolic signs or cyanosis.    Neuro:  Oriented; moves all 4; no focal deficits  Psychiatric: normal affect and mood, no memory impairment    Recent vitals (if inpt):  Patient Vitals for the past 24 hrs:   BP Temp Pulse Resp SpO2   10/10/19 0749 145/77 98.5 °F (36.9 °C) 64 18 95 %   10/10/19 0342 154/82 97.8 °F (36.6 °C) 63 18 95 %   10/09/19 2338 130/67 98.3 °F (36.8 °C) 64 16 95 %   10/09/19 2311 149/78  73 18 92 %   10/09/19 2203 (!) 179/99 98.9 °F (37.2 °C) 88 18 95 %       Labs:  Recent Labs     10/10/19  0447 10/09/19  1639   WBC 8.2  --    HGB 11.3*  --      --     142   K 4.2 3.8   * 109*   CO2 32 29   BUN 19 29*   CREA 0.97 0.95   * 122*   TBILI 0.4 0.2   SGOT 14* 19   ALT 23 24   AP 60 50   LPSE  --  140       Lab Results   Component Value Date/Time    WBC 8.2 10/10/2019 04:47 AM    HGB 11.3 (L) 10/10/2019 04:47 AM    PLATELET 885 38/76/3888 04:47 AM    Sodium 144 10/10/2019 04:47 AM    Potassium 4.2 10/10/2019 04:47 AM    Chloride 110 (H) 10/10/2019 04:47 AM    CO2 32 10/10/2019 04:47 AM    BUN 19 10/10/2019 04:47 AM    Creatinine 0.97 10/10/2019 04:47 AM    Glucose 112 (H) 10/10/2019 04:47 AM    Bilirubin, total 0.4 10/10/2019 04:47 AM    AST (SGOT) 14 (L) 10/10/2019 04:47 AM    ALT (SGPT) 23 10/10/2019 04:47 AM    Alk. phosphatase 60 10/10/2019 04:47 AM    Lipase 140 10/09/2019 04:39 PM       I reviewed recent labs and recent radiologic studies. I independently reviewed radiology images for studies I described above or studies I have ordered.    Admission date (for inpatients): 10/9/2019   * No surgery found *  * No surgery found *    ASSESSMENT/PLAN:  Problem List  Date Reviewed: 1/29/2018          Codes Class Noted    * (Principal) Diverticulitis of intestine with abscess ICD-10-CM: K57.80  ICD-9-CM: 562.11, 569.5  10/9/2019        Postsurgical hypothyroidism ICD-10-CM: E89.0  ICD-9-CM: 244.0  Unknown        Multinodular goiter ICD-10-CM: E04.2  ICD-9-CM: 241.1  Unknown        Shortness of breath ICD-10-CM: R06.02  ICD-9-CM: 786.05 12/28/2017        Pure hypercholesterolemia ICD-10-CM: E78.00  ICD-9-CM: 272.0  12/28/2017        Precordial pain ICD-10-CM: R07.2  ICD-9-CM: 786.51  12/26/2017        Hypertension ICD-10-CM: I10  ICD-9-CM: 401.9  12/26/2017            Principal Problem:    Diverticulitis of intestine with abscess (10/9/2019)    Active Problems:    Hypertension (12/26/2017)      Pure hypercholesterolemia (12/28/2017)      Multinodular goiter ()      Care per primary  IR consult  ABX  Monitor labs  Will follow along  No urgent surgical needs; hopefully will allow for abscess resolution then discuss future elective colon resection     Signed:  Krystal Alejandre NP   No need for surgical intervention. New Hernandez Jr. M.D.

## 2019-10-10 NOTE — PROGRESS NOTES
TRANSFER - IN REPORT:    Verbal report received from FLORA Campos on Seble Wolfe  being received from [place] for routine progression of care      Report consisted of patients Situation, Background, Assessment and   Recommendations(SBAR). Information from the following report(s) SBAR was reviewed with the receiving nurse. Opportunity for questions and clarification was provided. Assessment completed upon patients arrival to unit and care assumed.

## 2019-10-10 NOTE — PROGRESS NOTES
PT note:    Chart reviewed and attempted PT evaluation this afternoon however pt currently working with OT. Will check back later time/date as schedule allows.     Thanks,  Nancy Foote, LISETT

## 2019-10-11 LAB
ANION GAP SERPL CALC-SCNC: 6 MMOL/L (ref 7–16)
BASOPHILS # BLD: 0 K/UL (ref 0–0.2)
BASOPHILS NFR BLD: 1 % (ref 0–2)
BUN SERPL-MCNC: 10 MG/DL (ref 8–23)
CALCIUM SERPL-MCNC: 9.2 MG/DL (ref 8.3–10.4)
CHLORIDE SERPL-SCNC: 109 MMOL/L (ref 98–107)
CO2 SERPL-SCNC: 29 MMOL/L (ref 21–32)
CREAT SERPL-MCNC: 0.91 MG/DL (ref 0.6–1)
DIFFERENTIAL METHOD BLD: ABNORMAL
EOSINOPHIL # BLD: 0.1 K/UL (ref 0–0.8)
EOSINOPHIL NFR BLD: 1 % (ref 0.5–7.8)
ERYTHROCYTE [DISTWIDTH] IN BLOOD BY AUTOMATED COUNT: 14.8 % (ref 11.9–14.6)
GLUCOSE BLD STRIP.AUTO-MCNC: 124 MG/DL (ref 65–100)
GLUCOSE SERPL-MCNC: 112 MG/DL (ref 65–100)
HCT VFR BLD AUTO: 38.2 % (ref 35.8–46.3)
HGB BLD-MCNC: 12.2 G/DL (ref 11.7–15.4)
IMM GRANULOCYTES # BLD AUTO: 0 K/UL (ref 0–0.5)
IMM GRANULOCYTES NFR BLD AUTO: 0 % (ref 0–5)
LYMPHOCYTES # BLD: 1.8 K/UL (ref 0.5–4.6)
LYMPHOCYTES NFR BLD: 27 % (ref 13–44)
MCH RBC QN AUTO: 31.9 PG (ref 26.1–32.9)
MCHC RBC AUTO-ENTMCNC: 31.9 G/DL (ref 31.4–35)
MCV RBC AUTO: 100 FL (ref 79.6–97.8)
MONOCYTES # BLD: 0.5 K/UL (ref 0.1–1.3)
MONOCYTES NFR BLD: 8 % (ref 4–12)
NEUTS SEG # BLD: 4.2 K/UL (ref 1.7–8.2)
NEUTS SEG NFR BLD: 64 % (ref 43–78)
NRBC # BLD: 0 K/UL (ref 0–0.2)
PLATELET # BLD AUTO: 215 K/UL (ref 150–450)
PMV BLD AUTO: 9.5 FL (ref 9.4–12.3)
POTASSIUM SERPL-SCNC: 4.1 MMOL/L (ref 3.5–5.1)
RBC # BLD AUTO: 3.82 M/UL (ref 4.05–5.2)
SODIUM SERPL-SCNC: 144 MMOL/L (ref 136–145)
WBC # BLD AUTO: 6.6 K/UL (ref 4.3–11.1)

## 2019-10-11 PROCEDURE — 82962 GLUCOSE BLOOD TEST: CPT

## 2019-10-11 PROCEDURE — 74011250636 HC RX REV CODE- 250/636: Performed by: INTERNAL MEDICINE

## 2019-10-11 PROCEDURE — 74011000258 HC RX REV CODE- 258: Performed by: INTERNAL MEDICINE

## 2019-10-11 PROCEDURE — 80048 BASIC METABOLIC PNL TOTAL CA: CPT

## 2019-10-11 PROCEDURE — 85025 COMPLETE CBC W/AUTO DIFF WBC: CPT

## 2019-10-11 PROCEDURE — 74011250636 HC RX REV CODE- 250/636: Performed by: FAMILY MEDICINE

## 2019-10-11 PROCEDURE — 36415 COLL VENOUS BLD VENIPUNCTURE: CPT

## 2019-10-11 PROCEDURE — 74011000250 HC RX REV CODE- 250: Performed by: FAMILY MEDICINE

## 2019-10-11 PROCEDURE — 65270000029 HC RM PRIVATE

## 2019-10-11 PROCEDURE — 74011250637 HC RX REV CODE- 250/637: Performed by: INTERNAL MEDICINE

## 2019-10-11 RX ADMIN — LEVOTHYROXINE SODIUM 100 MCG: 100 TABLET ORAL at 05:22

## 2019-10-11 RX ADMIN — PIPERACILLIN AND TAZOBACTAM 3.38 G: 3; .375 INJECTION, POWDER, FOR SOLUTION INTRAVENOUS at 10:23

## 2019-10-11 RX ADMIN — Medication 10 ML: at 23:11

## 2019-10-11 RX ADMIN — PIPERACILLIN AND TAZOBACTAM 3.38 G: 3; .375 INJECTION, POWDER, FOR SOLUTION INTRAVENOUS at 17:33

## 2019-10-11 RX ADMIN — HYDRALAZINE HYDROCHLORIDE 10 MG: 20 INJECTION INTRAMUSCULAR; INTRAVENOUS at 05:25

## 2019-10-11 RX ADMIN — CARVEDILOL 3.12 MG: 3.12 TABLET, FILM COATED ORAL at 06:49

## 2019-10-11 RX ADMIN — HEPARIN SODIUM 5000 UNITS: 5000 INJECTION INTRAVENOUS; SUBCUTANEOUS at 17:34

## 2019-10-11 RX ADMIN — MORPHINE SULFATE 2 MG: 2 INJECTION, SOLUTION INTRAMUSCULAR; INTRAVENOUS at 01:53

## 2019-10-11 RX ADMIN — Medication 10 ML: at 14:00

## 2019-10-11 RX ADMIN — MORPHINE SULFATE 2 MG: 2 INJECTION, SOLUTION INTRAMUSCULAR; INTRAVENOUS at 13:24

## 2019-10-11 RX ADMIN — POTASSIUM CHLORIDE: 2 INJECTION, SOLUTION, CONCENTRATE INTRAVENOUS at 23:11

## 2019-10-11 RX ADMIN — CARVEDILOL 3.12 MG: 3.12 TABLET, FILM COATED ORAL at 17:34

## 2019-10-11 RX ADMIN — AMLODIPINE BESYLATE 10 MG: 10 TABLET ORAL at 08:49

## 2019-10-11 RX ADMIN — PIPERACILLIN AND TAZOBACTAM 3.38 G: 3; .375 INJECTION, POWDER, FOR SOLUTION INTRAVENOUS at 01:41

## 2019-10-11 RX ADMIN — MORPHINE SULFATE 2 MG: 2 INJECTION, SOLUTION INTRAMUSCULAR; INTRAVENOUS at 23:26

## 2019-10-11 RX ADMIN — HEPARIN SODIUM 5000 UNITS: 5000 INJECTION INTRAVENOUS; SUBCUTANEOUS at 09:00

## 2019-10-11 RX ADMIN — HEPARIN SODIUM 5000 UNITS: 5000 INJECTION INTRAVENOUS; SUBCUTANEOUS at 23:11

## 2019-10-11 RX ADMIN — FAMOTIDINE 20 MG: 10 INJECTION INTRAVENOUS at 08:49

## 2019-10-11 NOTE — PROGRESS NOTES
Interdisciplinary Rounds completed 10/11/19. Nursing, Case Management, Physician and PT present. Plan of care reviewed and updated.     Possible d/c on Sunday

## 2019-10-11 NOTE — PROGRESS NOTES
Progress Note    Patient: Edward Marin MRN: [id number]  SSN: [id number]    YOB: 1945  Age: 76 y.o. Sex: female      Admit Date: 10/9/2019    LOS: 2 days     Subjective:   She still has pain, but has improved. 7/10 in intensity. Denies nausea, no vomiting, no diarrhea    Objective:     Vitals:    10/11/19 0649 10/11/19 0735 10/11/19 1151 10/11/19 1655   BP: (!) 171/101 155/87 (!) 159/95 (!) 147/98   Pulse: 76 75 81 92   Resp:  18 18 18   Temp:  98.4 °F (36.9 °C) 98.2 °F (36.8 °C) 98.8 °F (37.1 °C)   SpO2:  98% 97% 98%   Weight:            Intake and Output:  Current Shift: 10/11 0701 - 10/11 1900  In: -   Out: 900 [Urine:900]  Last three shifts: 10/09 1901 - 10/11 0700  In: -   Out: 2125 [Urine:2125]    Physical Exam:   GENERAL: alert, cooperative, no distress, appears stated age  EYE: negative  LYMPHATIC: Cervical, supraclavicular, and axillary nodes normal.   THROAT & NECK: normal and no erythema or exudates noted. LUNG: clear to auscultation bilaterally  HEART: regular rate and rhythm, S1, S2 normal, no murmur, click, rub or gallop  ABDOMEN: soft, tenderness to touch over lower abdomen, right side more than left. Bowel sounds normal. No masses,  no organomegaly. No rebound   EXTREMITIES:  extremities normal, atraumatic, no cyanosis or edema  SKIN: Normal.  NEUROLOGIC: negative  PSYCHIATRIC: non focal    Lab/Data Review: All lab results for the last 24 hours reviewed.      Assessment:     Principal Problem:    Diverticulitis of intestine with abscess (10/9/2019)    Active Problems:    Hypertension (12/26/2017)      Pure hypercholesterolemia (12/28/2017)      Multinodular goiter ()        Plan:   -Acute sigmoid diverticulitis with microperforation + pelvic abscess:  Still with abdominal pain  No surgical indication  IR is not planning aspiration in view of the abscess size   Keep NPO for now   IVFs  Zosyn   Pain control   Daily labs  General surgery plans no procedure -HTN  controlled  carvedilol and amlodipine   hydralazine PRN     DVT ppx: heparin    Code status: Full     Disposition: home once medically ready     Signed By: Winona Bosworth, MD     October 11, 2019

## 2019-10-11 NOTE — PROGRESS NOTES
Problem: Pain  Goal: *Control of Pain  Outcome: Progressing Towards Goal     Problem: Patient Education: Go to Patient Education Activity  Goal: Patient/Family Education  Outcome: Progressing Towards Goal     Problem: General Infection Care Plan (Adult and Pediatric)  Goal: Improvement in signs and symptoms of infection  Outcome: Progressing Towards Goal  Goal: *Optimize nutritional status  Outcome: Progressing Towards Goal     Problem: Patient Education: Go to Patient Education Activity  Goal: Patient/Family Education  Outcome: Progressing Towards Goal     Problem: Patient Education: Go to Patient Education Activity  Goal: Patient/Family Education  Outcome: Progressing Towards Goal

## 2019-10-11 NOTE — PROGRESS NOTES
Hourly rounds completed this shift. Patient resting in bed. All needs met at this time. Report given to oncoming RN.

## 2019-10-11 NOTE — MED STUDENT NOTES
Subjective: 
Mrs. Shree Rick is a 76 yr old female who presented to the ED at [facility] on 10/9/2019 with sharp and cramping RLQ abdominal pain for approximately 1 week. Patient reported she went to an urgent care () last week when the abdominal pain started and was told she had a possible bladder infection, but was not given antibiotics. She stated her symptoms continued to worsen and therefore she went to the ED. CT completed in the ED showed acute distal sigmoid diverticulitis probably complicated by focal perforation and a 2.1 x 5.2cm small diverticular abscess. Surgery was consulted, but didn't feel surgery was necessary, Mrs. Shree Rick was transferred to [place] for interventional radiology consultation for drainage.  
  
Today, Mrs. Shree Rick appears in no distress or discomfort. She was up in the chair and asking about taking a shower. Her abdominal pain was ranked a 7/10 this morning. She is confused this morning about her treatment plan, diagnosis, and why she is unable to go home. When asked if she remembered what was discussed yesterday she got tearful and and stated she \"sometimes has a hard time remembering things from day to day. \" I spent time explaining that she needed at least 3 days of antibiotics for the abscess and she seemed to understand. She was happy about not needing surgery. She stated she just wanted fresh air and just wanted to leave. Her mood fluctuated throughout our conversation going from tearful one minute to happy and talkative another minute.  
  
PMH: HTN, hypercholesterolemia, multinodular goiter, hypothyroidism PSH: bilateral carpal tunnel release, hysterectomy, bilateral knee replacement, bilateral oophorectomy, left rotator cuff repair, right shoulder replacement  
  
ROS:  
Positive for abdominal pain (7/10) Negative for nausea, vomiting, diarrhea, constipation, dysuria, dizziness, weakness, fever, chills, numbness, tingling   
Objective: 
Vitals: Temp 98.4, Pulse 75, /87, Resp 18, O2 98% on room air 
  
Physical Exam: 
GEN: WDWN female in NAD 
C/V: RRR no murmurs, rubs, gallops Pulm: CTAB, no wheezes, rhonchi, rales ABD: soft, tender to palpation, no ecchymosis, normal bowel sounds, right abdomen distended SKIN: atraumatic, no ecchymosis or bruising, no jaundice EYE: no scleral icterus EXT: Radial and dorsalis pedis pulses are +3 bilaterally 
  
Assessment/Plan: 
1) Acute distal sigmoid diverticulitis probably complicated by focal perforation and a 2.1 x 5.2cm small diverticular abscess A) Interventional radiology was consulted and reviewed images. Due to the position of the abscess a drain cannot be     placed. B) Pt is NPO with occasional ice chips allowed (pt is receiving IVF for hydration) C) IV Zosyn x3 days (today is second day of treatment) D) Morphine 2mg Q4H PRN ordered for pain control (pts pain seems well controlled with no need for adjustment at this   time) 2) Hypothyroidism A) Continue Synthroid 100 mcg PO once daily 3) HTN 
           A) Continue amlodipine 10mg tab daily PO for HTN (pts BP remains uncontrolled. We will considered switching  medications for better BP control) B) Hydralazine ordered for systolic >262 *ATTENTION:  This note has been created by a medical student for educational purposes only. Please do not refer to the content of this note for clinical decision-making, billing, or other purposes. Please see attending physicians note to obtain clinical information on this patient. *

## 2019-10-11 NOTE — PROGRESS NOTES
H&P/Consult Note/Progress Note/Office Note:   Laney Leach  MRN: [id number]  [facility]:[birth year]  Age:74 y.o.    HPI: Laney Leach is a 76 y.o. female who we are asked by Dr. Autumn Barnes to see for diverticulitis with microperforation. The patient has a PMHx of HTN and hypothyroidism. She presented the ER with complaints of abdominal pain that started on 10/6/19. She was seen at [address] [place] [address] and was told she had a bladder infection and was given antibiotics. Her symptoms persisted so she came to the ER. She reports associated nausea and subjective fevers. She denies vomiting, melena, diarrhea, or chills. She has never had a colonoscopy. CTAP in the ER showed distal sigmoid diverticulitis probably complicated by focal perforation and a small diverticular abscess measuring 2.1x5.2cm. Labs were unremarkable with a WBC of 8.2.    10/11/19 AF, HTN, on RA, WBC 6.6. She reports pain is slightly improved compared to yesterday.         Past Medical History:   Diagnosis Date    Arthritis     Hypercholesteremia     Hypertension     Multinodular goiter     Postsurgical hypothyroidism      Past Surgical History:   Procedure Laterality Date    HX CARPAL TUNNEL RELEASE Bilateral     HX HYSTERECTOMY      HX KNEE REPLACEMENT Bilateral     HX OOPHORECTOMY Bilateral     HX ROTATOR CUFF REPAIR Left     HX SHOULDER REPLACEMENT Right     HX THYROIDECTOMY  ~1987     Current Facility-Administered Medications   Medication Dose Route Frequency    piperacillin-tazobactam (ZOSYN) 3.375 g in 0.9% sodium chloride (MBP/ADV) 100 mL  3.375 g IntraVENous Q8H    dextrose 5% - 0.9% NaCl with KCl 40 mEq/L infusion   IntraVENous CONTINUOUS    famotidine (PF) (PEPCID) 20 mg in sodium chloride 0.9% 10 mL injection  20 mg IntraVENous Q24H    amLODIPine (NORVASC) tablet 10 mg  10 mg Oral DAILY    carvedilol (COREG) tablet 3.125 mg  3.125 mg Oral BID WITH MEALS    levothyroxine (SYNTHROID) tablet 100 mcg  100 mcg Oral 6am    hydrALAZINE (APRESOLINE) 20 mg/mL injection 10 mg  10 mg IntraVENous Q6H PRN    acetaminophen (TYLENOL) tablet 650 mg  650 mg Oral Q4H PRN    ondansetron (ZOFRAN) injection 4 mg  4 mg IntraVENous Q4H PRN    sodium chloride (NS) flush 5-40 mL  5-40 mL IntraVENous Q8H    sodium chloride (NS) flush 5-40 mL  5-40 mL IntraVENous PRN    heparin (porcine) injection 5,000 Units  5,000 Units SubCUTAneous Q8H    morphine injection 2 mg  2 mg IntraVENous Q4H PRN     Aspirin; Ciprofloxacin; Influenza virus vaccines; Lisinopril; and Sulfa (sulfonamide antibiotics)  [unfilled]  Social History     Tobacco Use   Smoking Status Current Every Day Smoker    Packs/day: 0.50   Smokeless Tobacco Never Used     Family History   Problem Relation Age of Onset    Sudden Death Mother     Coronary Artery Disease Mother     Coronary Artery Disease Father     Coronary Artery Disease Maternal Aunt     Diabetes Maternal Aunt     Coronary Artery Disease Maternal Uncle     Diabetes Maternal Uncle     Coronary Artery Disease Maternal Grandmother     Diabetes Maternal Grandmother     Thyroid Disease Sister     Diabetes Maternal Aunt     Diabetes Maternal Aunt     Diabetes Maternal Aunt     Diabetes Maternal Uncle     Cancer Neg Hx     Thyroid Cancer Neg Hx      ROS: The patient has no difficulty with chest pain or shortness of breath. No fever or chills. Comprehensive review of systems was otherwise unremarkable except as noted above. Physical Exam:   Visit Vitals  /87 (BP 1 Location: Right arm, BP Patient Position: Sitting)   Pulse 75   Temp 98.4 °F (36.9 °C)   Resp 18   Wt 154 lb 1.6 oz (69.9 kg)   SpO2 98%   BMI 28.19 kg/m²     Constitutional: Alert, oriented, cooperative patient in no acute distress; appears stated age    Eyes:Sclera are clear. EOMs intact  ENMT: no external lesions gross hearing normal; no obvious neck masses, no ear or lip lesions, nares normal  CV: RRR. Normal perfusion  Resp: No JVD.   Breathing is  non-labored; no audible wheezing. GI: soft and non-distended; tender RLQ; -rebound, -guarding. +BS  Musculoskeletal: unremarkable with normal function. No embolic signs or cyanosis. Neuro:  Oriented; moves all 4; no focal deficits  Psychiatric: normal affect and mood, no memory impairment    Recent vitals (if inpt):  Patient Vitals for the past 24 hrs:   BP Temp Pulse Resp SpO2 Weight   10/11/19 0735 155/87 98.4 °F (36.9 °C) 75 18 98 %    10/11/19 0649 (!) 171/101  76      10/11/19 0605      154 lb 1.6 oz (69.9 kg)   10/11/19 0525 163/75  64      10/11/19 0407 (!) 164/93 98.5 °F (36.9 °C) 68 18 96 %    10/10/19 2352 146/80 98.5 °F (36.9 °C) 66 16 95 %    10/10/19 1936 153/79 98.7 °F (37.1 °C) 69 18 98 %    10/10/19 1816 152/76  75      10/10/19 1720 167/77  75      10/10/19 1713 (!) 161/91 98.1 °F (36.7 °C) 76 18 96 %    10/10/19 1500   80  95 %    10/10/19 1210 137/72 98.2 °F (36.8 °C) 65 18 96 %        Labs:  Recent Labs     10/11/19  0538 10/10/19  0447 10/09/19  1639   WBC 6.6 8.2  --    HGB 12.2 11.3*  --     189  --     144 142   K 4.1 4.2 3.8   * 110* 109*   CO2 29 32 29   BUN 10 19 29*   CREA 0.91 0.97 0.95   * 112* 122*   TBILI  --  0.4 0.2   SGOT  --  14* 19   ALT  --  23 24   AP  --  60 50   LPSE  --   --  140       Lab Results   Component Value Date/Time    WBC 6.6 10/11/2019 05:38 AM    HGB 12.2 10/11/2019 05:38 AM    PLATELET 844 18/02/2391 05:38 AM    Sodium 144 10/11/2019 05:38 AM    Potassium 4.1 10/11/2019 05:38 AM    Chloride 109 (H) 10/11/2019 05:38 AM    CO2 29 10/11/2019 05:38 AM    BUN 10 10/11/2019 05:38 AM    Creatinine 0.91 10/11/2019 05:38 AM    Glucose 112 (H) 10/11/2019 05:38 AM    Bilirubin, total 0.4 10/10/2019 04:47 AM    AST (SGOT) 14 (L) 10/10/2019 04:47 AM    ALT (SGPT) 23 10/10/2019 04:47 AM    Alk. phosphatase 60 10/10/2019 04:47 AM    Lipase 140 10/09/2019 04:39 PM       I reviewed recent labs and recent radiologic studies.     I independently reviewed radiology images for studies I described above or studies I have ordered.    Admission date (for inpatients): 10/9/2019   * No surgery found *  * No surgery found *    ASSESSMENT/PLAN:  Problem List  Date Reviewed: 1/29/2018          Codes Class Noted    * (Principal) Diverticulitis of intestine with abscess ICD-10-CM: K57.80  ICD-9-CM: 562.11, 569.5  10/9/2019        Postsurgical hypothyroidism ICD-10-CM: E89.0  ICD-9-CM: 244.0  Unknown        Multinodular goiter ICD-10-CM: E04.2  ICD-9-CM: 241.1  Unknown        Shortness of breath ICD-10-CM: R06.02  ICD-9-CM: 786.05  12/28/2017        Pure hypercholesterolemia ICD-10-CM: E78.00  ICD-9-CM: 272.0  12/28/2017        Precordial pain ICD-10-CM: R07.2  ICD-9-CM: 786.51  12/26/2017        Hypertension ICD-10-CM: I10  ICD-9-CM: 401.9  12/26/2017            Principal Problem:    Diverticulitis of intestine with abscess (10/9/2019)    Active Problems:    Hypertension (12/26/2017)      Pure hypercholesterolemia (12/28/2017)      Multinodular goiter ()      Care per primary  IR consulted-- per pt, she was told fluid collection too small to drain  ABX- on Zosyn  Monitor labs  Will follow along  No urgent surgical needs; hopefully will allow for abscess resolution then discuss future elective colon resection     Signed:  ENMA Hidalgo

## 2019-10-12 LAB
ANION GAP SERPL CALC-SCNC: 8 MMOL/L (ref 7–16)
BASOPHILS # BLD: 0 K/UL (ref 0–0.2)
BASOPHILS NFR BLD: 1 % (ref 0–2)
BUN SERPL-MCNC: 17 MG/DL (ref 8–23)
CALCIUM SERPL-MCNC: 9.7 MG/DL (ref 8.3–10.4)
CHLORIDE SERPL-SCNC: 106 MMOL/L (ref 98–107)
CO2 SERPL-SCNC: 27 MMOL/L (ref 21–32)
CREAT SERPL-MCNC: 1.15 MG/DL (ref 0.6–1)
DIFFERENTIAL METHOD BLD: ABNORMAL
EOSINOPHIL # BLD: 0 K/UL (ref 0–0.8)
EOSINOPHIL NFR BLD: 0 % (ref 0.5–7.8)
ERYTHROCYTE [DISTWIDTH] IN BLOOD BY AUTOMATED COUNT: 14.8 % (ref 11.9–14.6)
GLUCOSE SERPL-MCNC: 63 MG/DL (ref 65–100)
HCT VFR BLD AUTO: 40.9 % (ref 35.8–46.3)
HGB BLD-MCNC: 13.2 G/DL (ref 11.7–15.4)
IMM GRANULOCYTES # BLD AUTO: 0 K/UL (ref 0–0.5)
IMM GRANULOCYTES NFR BLD AUTO: 0 % (ref 0–5)
LYMPHOCYTES # BLD: 2 K/UL (ref 0.5–4.6)
LYMPHOCYTES NFR BLD: 27 % (ref 13–44)
MCH RBC QN AUTO: 32.3 PG (ref 26.1–32.9)
MCHC RBC AUTO-ENTMCNC: 32.3 G/DL (ref 31.4–35)
MCV RBC AUTO: 100 FL (ref 79.6–97.8)
MONOCYTES # BLD: 0.7 K/UL (ref 0.1–1.3)
MONOCYTES NFR BLD: 9 % (ref 4–12)
NEUTS SEG # BLD: 4.8 K/UL (ref 1.7–8.2)
NEUTS SEG NFR BLD: 63 % (ref 43–78)
NRBC # BLD: 0 K/UL (ref 0–0.2)
PLATELET # BLD AUTO: 240 K/UL (ref 150–450)
PMV BLD AUTO: 9.2 FL (ref 9.4–12.3)
POTASSIUM SERPL-SCNC: 3.8 MMOL/L (ref 3.5–5.1)
RBC # BLD AUTO: 4.09 M/UL (ref 4.05–5.2)
SODIUM SERPL-SCNC: 141 MMOL/L (ref 136–145)
WBC # BLD AUTO: 7.5 K/UL (ref 4.3–11.1)

## 2019-10-12 PROCEDURE — 74011250636 HC RX REV CODE- 250/636: Performed by: INTERNAL MEDICINE

## 2019-10-12 PROCEDURE — 74011000258 HC RX REV CODE- 258: Performed by: INTERNAL MEDICINE

## 2019-10-12 PROCEDURE — 36415 COLL VENOUS BLD VENIPUNCTURE: CPT

## 2019-10-12 PROCEDURE — 65270000029 HC RM PRIVATE

## 2019-10-12 PROCEDURE — 74011250636 HC RX REV CODE- 250/636: Performed by: FAMILY MEDICINE

## 2019-10-12 PROCEDURE — 74011000250 HC RX REV CODE- 250: Performed by: FAMILY MEDICINE

## 2019-10-12 PROCEDURE — 85025 COMPLETE CBC W/AUTO DIFF WBC: CPT

## 2019-10-12 PROCEDURE — 74011250637 HC RX REV CODE- 250/637: Performed by: INTERNAL MEDICINE

## 2019-10-12 PROCEDURE — 80048 BASIC METABOLIC PNL TOTAL CA: CPT

## 2019-10-12 RX ORDER — FAMOTIDINE 20 MG/1
20 TABLET, FILM COATED ORAL 2 TIMES DAILY
Status: DISCONTINUED | OUTPATIENT
Start: 2019-10-12 | End: 2019-10-12

## 2019-10-12 RX ORDER — FAMOTIDINE 20 MG/1
20 TABLET, FILM COATED ORAL DAILY
Status: DISCONTINUED | OUTPATIENT
Start: 2019-10-13 | End: 2019-10-13 | Stop reason: HOSPADM

## 2019-10-12 RX ADMIN — LEVOTHYROXINE SODIUM 100 MCG: 100 TABLET ORAL at 06:11

## 2019-10-12 RX ADMIN — Medication 10 ML: at 06:12

## 2019-10-12 RX ADMIN — PIPERACILLIN AND TAZOBACTAM 3.38 G: 3; .375 INJECTION, POWDER, FOR SOLUTION INTRAVENOUS at 01:56

## 2019-10-12 RX ADMIN — MORPHINE SULFATE 2 MG: 2 INJECTION, SOLUTION INTRAMUSCULAR; INTRAVENOUS at 21:53

## 2019-10-12 RX ADMIN — HEPARIN SODIUM 5000 UNITS: 5000 INJECTION INTRAVENOUS; SUBCUTANEOUS at 23:00

## 2019-10-12 RX ADMIN — HEPARIN SODIUM 5000 UNITS: 5000 INJECTION INTRAVENOUS; SUBCUTANEOUS at 09:14

## 2019-10-12 RX ADMIN — Medication 10 ML: at 14:00

## 2019-10-12 RX ADMIN — PIPERACILLIN AND TAZOBACTAM 3.38 G: 3; .375 INJECTION, POWDER, FOR SOLUTION INTRAVENOUS at 09:13

## 2019-10-12 RX ADMIN — PIPERACILLIN AND TAZOBACTAM 3.38 G: 3; .375 INJECTION, POWDER, FOR SOLUTION INTRAVENOUS at 17:14

## 2019-10-12 RX ADMIN — CARVEDILOL 3.12 MG: 3.12 TABLET, FILM COATED ORAL at 09:15

## 2019-10-12 RX ADMIN — AMLODIPINE BESYLATE 10 MG: 10 TABLET ORAL at 09:15

## 2019-10-12 RX ADMIN — MORPHINE SULFATE 2 MG: 2 INJECTION, SOLUTION INTRAMUSCULAR; INTRAVENOUS at 14:24

## 2019-10-12 RX ADMIN — Medication 10 ML: at 21:06

## 2019-10-12 RX ADMIN — CARVEDILOL 3.12 MG: 3.12 TABLET, FILM COATED ORAL at 17:14

## 2019-10-12 RX ADMIN — FAMOTIDINE 20 MG: 10 INJECTION INTRAVENOUS at 09:14

## 2019-10-12 RX ADMIN — HEPARIN SODIUM 5000 UNITS: 5000 INJECTION INTRAVENOUS; SUBCUTANEOUS at 14:24

## 2019-10-12 NOTE — PROGRESS NOTES
Hourly rounds completed this shift. Patient complained of pain & medicated per MAR. All needs met at this time. Oncoming nurse given report.

## 2019-10-12 NOTE — PROGRESS NOTES
Progress Note    Patient: Laney Leach MRN: [id number]  SSN: [id number]    YOB: 1945  Age: 76 y.o. Sex: female      Admit Date: 10/9/2019    LOS: 3 days     Subjective:   She feels better. Her pain is now 2/10 in intensity. Denies nausea and vomiting. Feels hungry. Objective:     Vitals:    10/11/19 1655 10/11/19 1935 10/12/19 0035 10/12/19 0353   BP: (!) 147/98 166/74 152/81 166/80   Pulse: 92 78 83 78   Resp: 18 18 18 18   Temp: 98.8 °F (37.1 °C) 98.8 °F (37.1 °C) 98.7 °F (37.1 °C) 98.4 °F (36.9 °C)   SpO2: 98% 98% 97% 98%   Weight:            Intake and Output:  Current Shift: No intake/output data recorded. Last three shifts: 10/10 1901 - 10/12 0700  In: -   Out: 900 [Urine:900]    Physical Exam:   GENERAL: alert, cooperative, no distress, appears stated age  EYE: negative  LYMPHATIC: Cervical, supraclavicular, and axillary nodes normal.   THROAT & NECK: normal and no erythema or exudates noted. LUNG: clear to auscultation bilaterally  HEART: regular rate and rhythm, S1, S2 normal, no murmur, click, rub or gallop  ABDOMEN: soft, tenderness to touch over lower abdomen, right side more than left. Bowel sounds normal. No masses,  no organomegaly. No rebound   EXTREMITIES:  extremities normal, atraumatic, no cyanosis or edema  SKIN: Normal.  NEUROLOGIC: negative  PSYCHIATRIC: non focal    Lab/Data Review: All lab results for the last 24 hours reviewed.      Assessment:     Principal Problem:    Diverticulitis of intestine with abscess (10/9/2019)    Active Problems:    Hypertension (12/26/2017)      Pure hypercholesterolemia (12/28/2017)      Multinodular goiter ()        Plan:   -Acute sigmoid diverticulitis with microperforation + pelvic abscess:  Still with abdominal pain  No surgical indication  IR is not planning aspiration in view of the abscess size   Start clear liquid diet   IVFs  Zosyn   Pain control   Daily labs  General surgery plans no procedure -HTN  controlled  carvedilol and amlodipine   hydralazine PRN     DVT ppx: heparin    Code status: Full     Disposition: home possible within 1-2 days     Signed By: Isauro Linares MD     October 12, 2019

## 2019-10-12 NOTE — PROGRESS NOTES
H&P/Consult Note/Progress Note/Office Note:   Maria R Stephens  MRN: [id number]  ISJ:[id number]  Age:74 y.o.    HPI: Maria R Stephens is a 76 y.o. female who we are asked by Dr. Tito Torres to see for diverticulitis with microperforation. The patient has a PMHx of HTN and hypothyroidism. She presented the ER with complaints of abdominal pain that started on 10/6/19. She was seen at [address] [place] [address] and was told she had a bladder infection and was given antibiotics. Her symptoms persisted so she came to the ER. She reports associated nausea and subjective fevers. She denies vomiting, melena, diarrhea, or chills. She has never had a colonoscopy. CTAP in the ER showed distal sigmoid diverticulitis probably complicated by focal perforation and a small diverticular abscess measuring 2.1x5.2cm. Labs were unremarkable with a WBC of 8.2.    10/11/19 AF, HTN, on RA, WBC 6.6. She reports pain is slightly improved compared to yesterday. 10/12/19 AF, HTN, on RA, WBC 7.5. Feeling better today. Reports minimal abd pain. +BM.      Past Medical History:   Diagnosis Date    Arthritis     Hypercholesteremia     Hypertension     Multinodular goiter     Postsurgical hypothyroidism      Past Surgical History:   Procedure Laterality Date    HX CARPAL TUNNEL RELEASE Bilateral     HX HYSTERECTOMY      HX KNEE REPLACEMENT Bilateral     HX OOPHORECTOMY Bilateral     HX ROTATOR CUFF REPAIR Left     HX SHOULDER REPLACEMENT Right     HX THYROIDECTOMY  ~1987     Current Facility-Administered Medications   Medication Dose Route Frequency    lactated Ringers 1,000 mL with potassium chloride 40 mEq infusion   IntraVENous CONTINUOUS    piperacillin-tazobactam (ZOSYN) 3.375 g in 0.9% sodium chloride (MBP/ADV) 100 mL  3.375 g IntraVENous Q8H    famotidine (PF) (PEPCID) 20 mg in sodium chloride 0.9% 10 mL injection  20 mg IntraVENous Q24H    amLODIPine (NORVASC) tablet 10 mg  10 mg Oral DAILY    carvedilol (COREG) tablet 3.125 mg  3.125 mg Oral BID WITH MEALS    levothyroxine (SYNTHROID) tablet 100 mcg  100 mcg Oral 6am    hydrALAZINE (APRESOLINE) 20 mg/mL injection 10 mg  10 mg IntraVENous Q6H PRN    acetaminophen (TYLENOL) tablet 650 mg  650 mg Oral Q4H PRN    ondansetron (ZOFRAN) injection 4 mg  4 mg IntraVENous Q4H PRN    sodium chloride (NS) flush 5-40 mL  5-40 mL IntraVENous Q8H    sodium chloride (NS) flush 5-40 mL  5-40 mL IntraVENous PRN    heparin (porcine) injection 5,000 Units  5,000 Units SubCUTAneous Q8H    morphine injection 2 mg  2 mg IntraVENous Q4H PRN     Aspirin; Ciprofloxacin; Influenza virus vaccines; Lisinopril; and Sulfa (sulfonamide antibiotics)  [unfilled]  Social History     Tobacco Use   Smoking Status Current Every Day Smoker    Packs/day: 0.50   Smokeless Tobacco Never Used     Family History   Problem Relation Age of Onset    Sudden Death Mother     Coronary Artery Disease Mother     Coronary Artery Disease Father     Coronary Artery Disease Maternal Aunt     Diabetes Maternal Aunt     Coronary Artery Disease Maternal Uncle     Diabetes Maternal Uncle     Coronary Artery Disease Maternal Grandmother     Diabetes Maternal Grandmother     Thyroid Disease Sister     Diabetes Maternal Aunt     Diabetes Maternal Aunt     Diabetes Maternal Aunt     Diabetes Maternal Uncle     Cancer Neg Hx     Thyroid Cancer Neg Hx      ROS: The patient has no difficulty with chest pain or shortness of breath. No fever or chills. Comprehensive review of systems was otherwise unremarkable except as noted above. Physical Exam:   Visit Vitals  BP (!) 163/97 (BP 1 Location: Left arm, BP Patient Position: Sitting)   Pulse 86   Temp 98.7 °F (37.1 °C)   Resp 18   Wt 154 lb 1.6 oz (69.9 kg)   SpO2 98%   BMI 28.19 kg/m²     Constitutional: Alert, oriented, cooperative patient in no acute distress; appears stated age    Eyes: Sclera are clear.  EOMs intact  ENMT: no external lesions gross hearing normal; no obvious neck masses, no ear or lip lesions, nares normal  CV: RRR. Normal perfusion  Resp: No JVD. Breathing is  non-labored; no audible wheezing. GI: soft and non-distended; improved tenderness RLQ; -rebound, -guarding. +BS  Musculoskeletal: unremarkable with normal function. No embolic signs or cyanosis. Neuro:  Oriented; moves all 4; no focal deficits  Psychiatric: normal affect and mood, no memory impairment    Recent vitals (if inpt):  Patient Vitals for the past 24 hrs:   BP Temp Pulse Resp SpO2   10/12/19 0827 (!) 163/97 98.7 °F (37.1 °C) 86 18 98 %   10/12/19 0353 166/80 98.4 °F (36.9 °C) 78 18 98 %   10/12/19 0035 152/81 98.7 °F (37.1 °C) 83 18 97 %   10/11/19 1935 166/74 98.8 °F (37.1 °C) 78 18 98 %   10/11/19 1655 (!) 147/98 98.8 °F (37.1 °C) 92 18 98 %   10/11/19 1151 (!) 159/95 98.2 °F (36.8 °C) 81 18 97 %       Labs:  Recent Labs     10/12/19  0527  10/10/19  0447 10/09/19  1639   WBC 7.5   < > 8.2  --    HGB 13.2   < > 11.3*  --       < > 189  --       < > 144 142   K 3.8   < > 4.2 3.8      < > 110* 109*   CO2 27   < > 32 29   BUN 17   < > 19 29*   CREA 1.15*   < > 0.97 0.95   GLU 63*   < > 112* 122*   TBILI  --   --  0.4 0.2   SGOT  --   --  14* 19   ALT  --   --  23 24   AP  --   --  60 50   LPSE  --   --   --  140    < > = values in this interval not displayed. Lab Results   Component Value Date/Time    WBC 7.5 10/12/2019 05:27 AM    HGB 13.2 10/12/2019 05:27 AM    PLATELET 821 42/97/7686 05:27 AM    Sodium 141 10/12/2019 05:27 AM    Potassium 3.8 10/12/2019 05:27 AM    Chloride 106 10/12/2019 05:27 AM    CO2 27 10/12/2019 05:27 AM    BUN 17 10/12/2019 05:27 AM    Creatinine 1.15 (H) 10/12/2019 05:27 AM    Glucose 63 (L) 10/12/2019 05:27 AM    Bilirubin, total 0.4 10/10/2019 04:47 AM    AST (SGOT) 14 (L) 10/10/2019 04:47 AM    ALT (SGPT) 23 10/10/2019 04:47 AM    Alk.  phosphatase 60 10/10/2019 04:47 AM    Lipase 140 10/09/2019 04:39 PM       I reviewed recent labs and recent radiologic studies. I independently reviewed radiology images for studies I described above or studies I have ordered. Admission date (for inpatients): 10/9/2019   * No surgery found *  * No surgery found *    ASSESSMENT/PLAN:  Problem List  Date Reviewed: 1/29/2018          Codes Class Noted    * (Principal) Diverticulitis of intestine with abscess ICD-10-CM: K57.80  ICD-9-CM: 562.11, 569.5  10/9/2019        Postsurgical hypothyroidism ICD-10-CM: E89.0  ICD-9-CM: 244.0  Unknown        Multinodular goiter ICD-10-CM: E04.2  ICD-9-CM: 241.1  Unknown        Shortness of breath ICD-10-CM: R06.02  ICD-9-CM: 786.05  12/28/2017        Pure hypercholesterolemia ICD-10-CM: E78.00  ICD-9-CM: 272.0  12/28/2017        Precordial pain ICD-10-CM: R07.2  ICD-9-CM: 786.51  12/26/2017        Hypertension ICD-10-CM: I10  ICD-9-CM: 401.9  12/26/2017            Principal Problem:    Diverticulitis of intestine with abscess (10/9/2019)    Active Problems:    Hypertension (12/26/2017)      Pure hypercholesterolemia (12/28/2017)      Multinodular goiter ()      Care per primary  IR consulted-- per pt, she was told fluid collection too small to drain  ABX- on [address] from a surgical standpoint for CLD --> advance as tolerated. If pt tolerates diet may be able to transition to oral abx soon. No urgent surgical needs; hopefully will allow for abscess resolution then discuss future elective colon resection. General Surgery will sign off. Thank you for the consult. F/u in office to discuss elective colon resection. Signed:  Haim Montelongo NP     I have personally performed a face-to-face diagnostic evaluation and management  service on this patient. I have independently seen the patient. I have independently obtained the above history from the patient/family. I have independently examined the patient with above findings.   I have independently reviewed data/labs for this patient and developed the above plan of [facility].    [place] [address] surgical [facility]

## 2019-10-12 NOTE — MED STUDENT NOTES
Subjective: 
Mrs. Momo Bishop is a 76 yr old female who presented to the ED at [facility] on 10/9/2019 with sharp and cramping RLQ abdominal pain for approximately 1 week. Patient reported she went to an urgent care () last week when the abdominal pain started and was told she had a possible bladder infection, but was not given antibiotics. She stated her symptoms continued to worsen and therefore she went to the ED. CT completed in the ED showed acute distal sigmoid diverticulitis probably complicated by focal perforation and a 2.1 x 5.2cm small diverticular abscess. Surgery was consulted, but didn't feel surgery was necessary, Mrs. Momo Bishop was transferred to [place] for interventional radiology consultation for drainage.  
  
Today, Mrs. Momo Bishop was up and walking around. She states she is in no distress or discomfort. She was upset again today when I told her she would not be going home today and that she would miss [facility] tomorrow. She again was confused as to the name of her illness, but when asked she could describe that she had an \"angry bowel. \" She was upset that we were not treating her osteoarthritis while she was at the hospital. She claims to have woken up this morning at 2am and has not gone back to sleep due to pain all over. She believes that the morphine we are giving her is not real morphine and someone is tricking her as it only seems to last 30 minutes.   
  
PMH: HTN, hypercholesterolemia, multinodular goiter, hypothyroidism PSH: bilateral carpal tunnel release, hysterectomy, bilateral knee replacement, bilateral oophorectomy, left rotator cuff repair, right shoulder replacement  
  
ROS:  
Positive for joint pain Negative for nausea, vomiting, diarrhea, constipation, dysuria, dizziness, weakness, fever, chills, numbness, tingling, abdominal pain  
  
Objective: 
Vitals: Temp 98.7, Pulse 86, /97, Resp 18, O2 98% on room air 
  
 Physical Exam: 
GEN: WDWN female in NAD 
C/V: RRR no murmurs, rubs, gallops Pulm: CTAB, no wheezes, rhonchi, rales ABD: soft, non-tender to palpation, no ecchymosis, normal bowel sounds SKIN: atraumatic, no ecchymosis or bruising, no jaundice EYE: no scleral icterus EXT: Radial and dorsalis pedis pulses are +3 bilaterally 
  
Assessment/Plan: 
1) Acute distal sigmoid diverticulitis probably complicated by focal perforation and a 2.1 x 5.2cm small diverticular abscess            I) Interventional radiology was consulted and reviewed images. Due to the position of the abscess a drain cannot be placed.  
           B) Pt is NPO with occasional ice chips allowed (pt is receiving IVF for hydration) 
           C) IV Zosyn x3 days (today is second day of treatment) 
           D) Morphine 2mg Q4H PRN ordered for pain control (pts pain seems well controlled with no need for adjustment at this time) 2) Hypothyroidism 
           A) Continue Synthroid 100 mcg PO once daily 3) HTN 
           A) Continue amlodipine 10mg tab daily PO for HTN  
           B) Hydralazine ordered for systolic >103 
4) Osteoarthritis A) Pt claims she take [place] at home and is being followed by a pain doctor B) She is complaining of pain all over today that woke her up last night *ATTENTION:  This note has been created by a medical student for educational purposes only. Please do not refer to the content of this note for clinical decision-making, billing, or other purposes. Please see attending physicians note to obtain clinical information on this patient. *

## 2019-10-13 VITALS
DIASTOLIC BLOOD PRESSURE: 84 MMHG | BODY MASS INDEX: 28.19 KG/M2 | RESPIRATION RATE: 18 BRPM | TEMPERATURE: 97.2 F | SYSTOLIC BLOOD PRESSURE: 151 MMHG | WEIGHT: 154.1 LBS | OXYGEN SATURATION: 99 % | HEART RATE: 69 BPM

## 2019-10-13 LAB
ANION GAP SERPL CALC-SCNC: 5 MMOL/L (ref 7–16)
BASOPHILS # BLD: 0 K/UL (ref 0–0.2)
BASOPHILS NFR BLD: 0 % (ref 0–2)
BUN SERPL-MCNC: 16 MG/DL (ref 8–23)
CALCIUM SERPL-MCNC: 9.3 MG/DL (ref 8.3–10.4)
CHLORIDE SERPL-SCNC: 109 MMOL/L (ref 98–107)
CO2 SERPL-SCNC: 28 MMOL/L (ref 21–32)
CREAT SERPL-MCNC: 1.24 MG/DL (ref 0.6–1)
DIFFERENTIAL METHOD BLD: ABNORMAL
EOSINOPHIL # BLD: 0.1 K/UL (ref 0–0.8)
EOSINOPHIL NFR BLD: 1 % (ref 0.5–7.8)
ERYTHROCYTE [DISTWIDTH] IN BLOOD BY AUTOMATED COUNT: 14.9 % (ref 11.9–14.6)
GLUCOSE BLD STRIP.AUTO-MCNC: 120 MG/DL (ref 65–100)
GLUCOSE SERPL-MCNC: 126 MG/DL (ref 65–100)
HCT VFR BLD AUTO: 38.8 % (ref 35.8–46.3)
HGB BLD-MCNC: 12.5 G/DL (ref 11.7–15.4)
IMM GRANULOCYTES # BLD AUTO: 0 K/UL (ref 0–0.5)
IMM GRANULOCYTES NFR BLD AUTO: 0 % (ref 0–5)
LYMPHOCYTES # BLD: 1.8 K/UL (ref 0.5–4.6)
LYMPHOCYTES NFR BLD: 27 % (ref 13–44)
MCH RBC QN AUTO: 32.1 PG (ref 26.1–32.9)
MCHC RBC AUTO-ENTMCNC: 32.2 G/DL (ref 31.4–35)
MCV RBC AUTO: 99.7 FL (ref 79.6–97.8)
MONOCYTES # BLD: 0.6 K/UL (ref 0.1–1.3)
MONOCYTES NFR BLD: 10 % (ref 4–12)
NEUTS SEG # BLD: 4.2 K/UL (ref 1.7–8.2)
NEUTS SEG NFR BLD: 62 % (ref 43–78)
NRBC # BLD: 0 K/UL (ref 0–0.2)
PLATELET # BLD AUTO: 221 K/UL (ref 150–450)
PMV BLD AUTO: 9.3 FL (ref 9.4–12.3)
POTASSIUM SERPL-SCNC: 3.8 MMOL/L (ref 3.5–5.1)
RBC # BLD AUTO: 3.89 M/UL (ref 4.05–5.2)
SODIUM SERPL-SCNC: 142 MMOL/L (ref 136–145)
WBC # BLD AUTO: 6.7 K/UL (ref 4.3–11.1)

## 2019-10-13 PROCEDURE — 74011000258 HC RX REV CODE- 258: Performed by: INTERNAL MEDICINE

## 2019-10-13 PROCEDURE — 77030020253 HC SOL INJ D545NS .05 DEX .45 SAL

## 2019-10-13 PROCEDURE — 74011250637 HC RX REV CODE- 250/637: Performed by: INTERNAL MEDICINE

## 2019-10-13 PROCEDURE — 82962 GLUCOSE BLOOD TEST: CPT

## 2019-10-13 PROCEDURE — 74011250636 HC RX REV CODE- 250/636: Performed by: INTERNAL MEDICINE

## 2019-10-13 PROCEDURE — 80048 BASIC METABOLIC PNL TOTAL CA: CPT

## 2019-10-13 PROCEDURE — 85025 COMPLETE CBC W/AUTO DIFF WBC: CPT

## 2019-10-13 PROCEDURE — 36415 COLL VENOUS BLD VENIPUNCTURE: CPT

## 2019-10-13 RX ORDER — CIPROFLOXACIN 500 MG/1
500 TABLET ORAL 2 TIMES DAILY
Qty: 24 TAB | Refills: 0 | Status: SHIPPED | OUTPATIENT
Start: 2019-10-13 | End: 2019-10-25

## 2019-10-13 RX ORDER — METRONIDAZOLE 500 MG/1
500 TABLET ORAL 3 TIMES DAILY
Qty: 36 TAB | Refills: 0 | Status: SHIPPED | OUTPATIENT
Start: 2019-10-13 | End: 2019-10-25

## 2019-10-13 RX ORDER — HYDROCODONE BITARTRATE AND ACETAMINOPHEN 7.5; 325 MG/1; MG/1
1 TABLET ORAL
Qty: 15 TAB | Refills: 0 | Status: SHIPPED | OUTPATIENT
Start: 2019-10-13 | End: 2019-10-20

## 2019-10-13 RX ADMIN — AMLODIPINE BESYLATE 10 MG: 10 TABLET ORAL at 09:27

## 2019-10-13 RX ADMIN — PIPERACILLIN AND TAZOBACTAM 3.38 G: 3; .375 INJECTION, POWDER, FOR SOLUTION INTRAVENOUS at 09:27

## 2019-10-13 RX ADMIN — Medication 10 ML: at 05:29

## 2019-10-13 RX ADMIN — FAMOTIDINE 20 MG: 20 TABLET ORAL at 09:27

## 2019-10-13 RX ADMIN — CARVEDILOL 3.12 MG: 3.12 TABLET, FILM COATED ORAL at 09:28

## 2019-10-13 RX ADMIN — LEVOTHYROXINE SODIUM 100 MCG: 100 TABLET ORAL at 05:29

## 2019-10-13 RX ADMIN — HEPARIN SODIUM 5000 UNITS: 5000 INJECTION INTRAVENOUS; SUBCUTANEOUS at 09:28

## 2019-10-13 RX ADMIN — PIPERACILLIN AND TAZOBACTAM 3.38 G: 3; .375 INJECTION, POWDER, FOR SOLUTION INTRAVENOUS at 01:35

## 2019-10-13 NOTE — DISCHARGE INSTRUCTIONS
Patient Education        Diverticulitis: Care Instructions  Your Care Instructions    Diverticulitis occurs when pouches form in the wall of the colon and become inflamed or infected. It can be very painful. Doctors aren't sure what causes diverticulitis. There is no proof that foods such as nuts, seeds, or berries cause it or make it worse. A low-fiber diet may cause the colon to work harder to push stool forward. Pouches may form because of this extra work. It may be hard to think about healthy eating while you're in pain. But as you recover, you might think about how you can use healthy eating for overall better health. Healthy eating may help you avoid future attacks. Follow-up care is a key part of your treatment and safety. Be sure to make and go to all appointments, and call your doctor if you are having problems. It's also a good idea to know your test results and keep a list of the medicines you take. How can you care for yourself at home? · Drink plenty of fluids, enough so that your urine is light yellow or clear like water. If you have kidney, heart, or liver disease and have to limit fluids, talk with your doctor before you increase the amount of fluids you drink. · Stick to liquids or a bland diet (plain rice, bananas, dry toast or crackers, applesauce) until you are feeling better. Then you can return to regular foods and gradually increase the amount of fiber in your diet. · Use a heating pad set on low on your belly to relieve mild cramps and pain. · Get extra rest until you are feeling better. · Be safe with medicines. Read and follow all instructions on the label. ? If the doctor gave you a prescription medicine for pain, take it as prescribed. ? If you are not taking a prescription pain medicine, ask your doctor if you can take an over-the-counter medicine. · If your doctor prescribed antibiotics, take them as directed. Do not stop taking them just because you feel better.  You need to take the full course of antibiotics. To prevent future attacks of diverticulitis  · Avoid constipation:  ? Include fruits, vegetables, beans, and whole grains in your diet each day. These foods are high in fiber. ? Drink plenty of fluids, enough so that your urine is light yellow or clear like water. If you have kidney, heart, or liver disease and have to limit fluids, talk with your doctor before you increase the amount of fluids you drink. ? Get some exercise every day. Build up slowly to 30 to 60 minutes a day on 5 or more days of the week. ? Take a fiber supplement, such as Citrucel or Metamucil, every day if needed. Read and follow all instructions on the label. ? Schedule time each day for a bowel movement. Having a daily routine may help. Take your time and do not strain when having a bowel movement. When should you call for help? Call your doctor now or seek immediate medical care if:    · You have a fever.     · You are vomiting.     · You have new or worse belly pain.     · You cannot pass stools or gas.    Watch closely for changes in your health, and be sure to contact your doctor if you have any problems. Where can you learn more? Go to http://irvin-maría.info/. Enter H901 in the search box to learn more about \"Diverticulitis: Care Instructions. \"  Current as of: November 7, 2018  Content Version: 12.2  © 4595-0003 [employer]. Care instructions adapted under license by [employer] (which disclaims liability or warranty for this information). If you have questions about a medical condition or this instruction, always ask your healthcare professional. [place] [address] any warranty or liability for your use of this information.          DISCHARGE SUMMARY from Nurse    PATIENT INSTRUCTIONS:    After general anesthesia or intravenous sedation, for 24 hours or while taking prescription Narcotics:  · Limit your activities  · Do not drive and operate hazardous machinery  · Do not make important personal or business decisions  · Do  not drink alcoholic beverages  · If you have not urinated within 8 hours after discharge, please contact your surgeon on call. Report the following to your surgeon:  · Excessive pain, swelling, redness or odor of or around the surgical area  · Temperature over 100.5  · Nausea and vomiting lasting longer than 4 hours or if unable to take medications  · Any signs of decreased circulation or nerve impairment to extremity: change in color, persistent  numbness, tingling, coldness or increase pain  · Any questions    What to do at Home:  Recommended activity: Activity as tolerated,     If you experience any of the following symptoms fever greater then 100.5, pain unrelieved by medications, increase in shortness, please follow up with primary care . *  Please give a list of your current medications to your Primary Care Provider. *  Please update this list whenever your medications are discontinued, doses are      changed, or new medications (including over-the-counter products) are added. *  Please carry medication information at all times in case of emergency situations. These are general instructions for a healthy lifestyle:    No smoking/ No tobacco products/ Avoid exposure to second hand smoke  Surgeon General's Warning:  Quitting smoking now greatly reduces serious risk to your health. Obesity, smoking, and sedentary lifestyle greatly increases your risk for illness    A healthy diet, regular physical exercise & weight monitoring are important for maintaining a healthy lifestyle    You may be retaining fluid if you have a history of heart failure or if you experience any of the following symptoms:  Weight gain of 3 pounds or more overnight or 5 pounds in a week, increased swelling in our hands or feet or shortness of breath while lying flat in bed.   Please call your doctor as soon as you notice any of these symptoms; do not wait until your next office visit. The discharge information has been reviewed with the patient. The patient verbalized understanding. Discharge medications reviewed with the patient and appropriate educational materials and side effects teaching were provided.   ___________________________________________________________________________________________________________________________________

## 2019-10-13 NOTE — PROGRESS NOTES
Hourly rounds done. Pt c/o pain, medicated per MAR. Denies nausea, vomiting. Pt anxious for DC today. All needs met at this time.

## 2019-10-13 NOTE — MED STUDENT NOTES
Subjective: 
Mrs. Amauri Tsang is a 76 yr old female who presented to the ED at [facility] on 10/9/2019 with sharp and cramping RLQ abdominal pain for approximately 1 week. Patient reported she went to an urgent care () last week when the abdominal pain started and was told she had a possible bladder infection, but was not given antibiotics. She stated her symptoms continued to worsen and therefore she went to the ED. CT completed in the ED showed acute distal sigmoid diverticulitis probably complicated by focal perforation and a 2.1 x 5.2cm small diverticular abscess. Surgery was consulted, but didn't feel surgery was necessary, Mrs. Amauri Tsang was transferred to [place] for interventional radiology consultation for drainage.  
  
Today, Mrs. Lange Salt sitting in the chair with family at her side. She is excited about going home today. She states she is not having any abdominal pain or nausea/vomiting. She did not complain about joint pain. We discussed her going home after lunch. She ate clear liquids for breakfast and tolerated well.  
  
PMH: HTN, hypercholesterolemia, multinodular goiter, hypothyroidism PSH: bilateral carpal tunnel release, hysterectomy, bilateral knee replacement, bilateral oophorectomy, left rotator cuff repair, right shoulder replacement  
  
ROS:  
Pt stated no acute complaints Negative for nausea, vomiting, diarrhea, constipation, dysuria, dizziness, weakness, fever, chills, numbness, tingling, abdominal pain  
  
Objective: 
Vitals: Temp 97.8, Pulse 74, BP 129/83, Resp 20, O2 99% on room air 
  
Physical Exam: 
GEN: WDWN female in NAD 
C/V: RRR no murmurs, rubs, gallops Pulm: CTAB, no wheezes, rhonchi, rales ABD: soft, non-tender to palpation, no ecchymosis, normal bowel sounds SKIN: atraumatic, no ecchymosis or bruising, no jaundice EYE: no scleral icterus EXT: Radial and dorsalis pedis pulses are +3 bilaterally 
  
Assessment/Plan: 1) Acute distal sigmoid diverticulitis probably complicated by focal perforation and a 2.1 x 5.2cm small diverticular abscess            A) Interventional radiology was consulted and reviewed images. Due to the position of the abscess a drain cannot be placed.  
           S) Pt received clear liquid for breakfast and tolerated well. We will try solid diet for lunch. If tolerated well pt can be discharged this afternoon.  
           C) IV Zosyn x3 days (today is third day of treatment) 
           D) Pt was given literature on high fiber diet and understood new dietary needs. 2) Hypothyroidism 
           A) Continue Synthroid 100 mcg PO once daily 3) HTN 
           A) Continue amlodipine 10mg tab daily PO for HTN  
           B) Hydralazine ordered for systolic >424 
4) Osteoarthritis A) Pt to continue home medications on discharge today *ATTENTION:  This note has been created by a medical student for educational purposes only. Please do not refer to the content of this note for clinical decision-making, billing, or other purposes. Please see attending physicians note to obtain clinical information on this patient. *

## 2019-10-14 LAB
BACTERIA SPEC CULT: NORMAL
BACTERIA SPEC CULT: NORMAL
SERVICE CMNT-IMP: NORMAL
SERVICE CMNT-IMP: NORMAL

## 2019-10-22 ENCOUNTER — APPOINTMENT (RX ONLY)
Dept: URBAN - METROPOLITAN AREA CLINIC 349 | Facility: CLINIC | Age: 74
Setting detail: DERMATOLOGY
End: 2019-10-22

## 2019-10-22 DIAGNOSIS — Z71.89 OTHER SPECIFIED COUNSELING: ICD-10-CM

## 2019-10-22 DIAGNOSIS — L72.0 EPIDERMAL CYST: ICD-10-CM

## 2019-10-22 DIAGNOSIS — L82.1 OTHER SEBORRHEIC KERATOSIS: ICD-10-CM

## 2019-10-22 DIAGNOSIS — L70.8 OTHER ACNE: ICD-10-CM

## 2019-10-22 PROBLEM — E78.5 HYPERLIPIDEMIA, UNSPECIFIED: Status: ACTIVE | Noted: 2019-10-22

## 2019-10-22 PROBLEM — M12.9 ARTHROPATHY, UNSPECIFIED: Status: ACTIVE | Noted: 2019-10-22

## 2019-10-22 PROBLEM — E03.9 HYPOTHYROIDISM, UNSPECIFIED: Status: ACTIVE | Noted: 2019-10-22

## 2019-10-22 PROBLEM — I10 ESSENTIAL (PRIMARY) HYPERTENSION: Status: ACTIVE | Noted: 2019-10-22

## 2019-10-22 PROCEDURE — 99202 OFFICE O/P NEW SF 15 MIN: CPT

## 2019-10-22 PROCEDURE — ? COUNSELING

## 2019-10-22 PROCEDURE — ? EDUCATIONAL RESOURCES PROVIDED

## 2019-10-22 ASSESSMENT — LOCATION ZONE DERM
LOCATION ZONE: FACE
LOCATION ZONE: LEG
LOCATION ZONE: TRUNK

## 2019-10-22 ASSESSMENT — LOCATION DETAILED DESCRIPTION DERM
LOCATION DETAILED: LEFT DISTAL PRETIBIAL REGION
LOCATION DETAILED: LEFT SUPERIOR FOREHEAD
LOCATION DETAILED: RIGHT DISTAL PRETIBIAL REGION
LOCATION DETAILED: LEFT INFERIOR LATERAL LOWER BACK
LOCATION DETAILED: RIGHT PROXIMAL PRETIBIAL REGION

## 2019-10-22 ASSESSMENT — LOCATION SIMPLE DESCRIPTION DERM
LOCATION SIMPLE: LEFT LOWER BACK
LOCATION SIMPLE: LEFT PRETIBIAL REGION
LOCATION SIMPLE: RIGHT PRETIBIAL REGION
LOCATION SIMPLE: LEFT FOREHEAD

## 2019-10-22 NOTE — HPI: SKIN LESION
How Severe Is Your Skin Lesion?: mild
Has Your Skin Lesion Been Treated?: not been treated
Is This A New Presentation, Or A Follow-Up?: Skin Lesion
Additional History: Patient is to have a spot on her scalp and spots on her legs looked at. She states the spot on her scalp comes and goes and has been present for over thirty years. She denies any bleeding or oozing. Patient has several dark spots on her legs that she would like to have looked at. Patient denies any growing, bleeding or changing. Patient denies family or personal history of melanoma.

## 2020-08-01 ENCOUNTER — HOSPITAL ENCOUNTER (EMERGENCY)
Age: 75
Discharge: HOME | End: 2020-08-01
Attending: EMERGENCY MEDICINE
Payer: MEDICARE

## 2020-08-01 ENCOUNTER — APPOINTMENT (OUTPATIENT)
Dept: GENERAL RADIOLOGY | Age: 75
End: 2020-08-01
Attending: EMERGENCY MEDICINE
Payer: MEDICARE

## 2020-08-01 VITALS
TEMPERATURE: 98 F | HEIGHT: 62 IN | OXYGEN SATURATION: 98 % | DIASTOLIC BLOOD PRESSURE: 74 MMHG | WEIGHT: 157 LBS | SYSTOLIC BLOOD PRESSURE: 161 MMHG | BODY MASS INDEX: 28.89 KG/M2 | HEART RATE: 65 BPM | RESPIRATION RATE: 18 BRPM

## 2020-08-01 DIAGNOSIS — R07.89 ATYPICAL CHEST PAIN: Primary | ICD-10-CM

## 2020-08-01 LAB
ALBUMIN SERPL-MCNC: 3.7 G/DL (ref 3.2–4.6)
ALBUMIN/GLOB SERPL: 1 {RATIO} (ref 1.2–3.5)
ALP SERPL-CCNC: 80 U/L (ref 50–130)
ALT SERPL-CCNC: 32 U/L (ref 12–65)
ANION GAP SERPL CALC-SCNC: 2 MMOL/L (ref 7–16)
AST SERPL-CCNC: 22 U/L (ref 15–37)
ATRIAL RATE: 62 BPM
ATRIAL RATE: 67 BPM
BASOPHILS # BLD: 0 K/UL (ref 0–0.2)
BASOPHILS NFR BLD: 0 % (ref 0–2)
BILIRUB SERPL-MCNC: 0.3 MG/DL (ref 0.2–1.1)
BUN SERPL-MCNC: 30 MG/DL (ref 8–23)
CALCIUM SERPL-MCNC: 9.1 MG/DL (ref 8.3–10.4)
CALCULATED P AXIS, ECG09: 58 DEGREES
CALCULATED P AXIS, ECG09: 68 DEGREES
CALCULATED R AXIS, ECG10: -10 DEGREES
CALCULATED R AXIS, ECG10: -6 DEGREES
CALCULATED T AXIS, ECG11: 32 DEGREES
CALCULATED T AXIS, ECG11: 8 DEGREES
CHLORIDE SERPL-SCNC: 105 MMOL/L (ref 98–107)
CO2 SERPL-SCNC: 32 MMOL/L (ref 21–32)
CREAT SERPL-MCNC: 1.12 MG/DL (ref 0.6–1)
DIAGNOSIS, 93000: NORMAL
DIAGNOSIS, 93000: NORMAL
DIFFERENTIAL METHOD BLD: ABNORMAL
EOSINOPHIL # BLD: 0.1 K/UL (ref 0–0.8)
EOSINOPHIL NFR BLD: 1 % (ref 0.5–7.8)
ERYTHROCYTE [DISTWIDTH] IN BLOOD BY AUTOMATED COUNT: 14.5 % (ref 11.9–14.6)
GLOBULIN SER CALC-MCNC: 3.7 G/DL (ref 2.3–3.5)
GLUCOSE SERPL-MCNC: 114 MG/DL (ref 65–100)
HCT VFR BLD AUTO: 40.7 % (ref 35.8–46.3)
HGB BLD-MCNC: 13.5 G/DL (ref 11.7–15.4)
IMM GRANULOCYTES # BLD AUTO: 0 K/UL (ref 0–0.5)
IMM GRANULOCYTES NFR BLD AUTO: 0 % (ref 0–5)
LYMPHOCYTES # BLD: 2.8 K/UL (ref 0.5–4.6)
LYMPHOCYTES NFR BLD: 27 % (ref 13–44)
MCH RBC QN AUTO: 31.5 PG (ref 26.1–32.9)
MCHC RBC AUTO-ENTMCNC: 33.2 G/DL (ref 31.4–35)
MCV RBC AUTO: 94.9 FL (ref 79.6–97.8)
MONOCYTES # BLD: 0.8 K/UL (ref 0.1–1.3)
MONOCYTES NFR BLD: 8 % (ref 4–12)
NEUTS SEG # BLD: 6.6 K/UL (ref 1.7–8.2)
NEUTS SEG NFR BLD: 65 % (ref 43–78)
NRBC # BLD: 0 K/UL (ref 0–0.2)
P-R INTERVAL, ECG05: 114 MS
P-R INTERVAL, ECG05: 116 MS
PLATELET # BLD AUTO: 278 K/UL (ref 150–450)
PMV BLD AUTO: 9.1 FL (ref 9.4–12.3)
POTASSIUM SERPL-SCNC: 4.1 MMOL/L (ref 3.5–5.1)
PROT SERPL-MCNC: 7.4 G/DL (ref 6.3–8.2)
Q-T INTERVAL, ECG07: 392 MS
Q-T INTERVAL, ECG07: 418 MS
QRS DURATION, ECG06: 84 MS
QRS DURATION, ECG06: 88 MS
QTC CALCULATION (BEZET), ECG08: 414 MS
QTC CALCULATION (BEZET), ECG08: 424 MS
RBC # BLD AUTO: 4.29 M/UL (ref 4.05–5.2)
SODIUM SERPL-SCNC: 139 MMOL/L (ref 136–145)
TROPONIN-HIGH SENSITIVITY: 5.2 PG/ML (ref 0–14)
TROPONIN-HIGH SENSITIVITY: 5.2 PG/ML (ref 0–14)
VENTRICULAR RATE, ECG03: 62 BPM
VENTRICULAR RATE, ECG03: 67 BPM
WBC # BLD AUTO: 10.3 K/UL (ref 4.3–11.1)

## 2020-08-01 PROCEDURE — 74011250637 HC RX REV CODE- 250/637: Performed by: EMERGENCY MEDICINE

## 2020-08-01 PROCEDURE — 85025 COMPLETE CBC W/AUTO DIFF WBC: CPT

## 2020-08-01 PROCEDURE — 93005 ELECTROCARDIOGRAM TRACING: CPT | Performed by: EMERGENCY MEDICINE

## 2020-08-01 PROCEDURE — 74011000250 HC RX REV CODE- 250: Performed by: EMERGENCY MEDICINE

## 2020-08-01 PROCEDURE — 84484 ASSAY OF TROPONIN QUANT: CPT

## 2020-08-01 PROCEDURE — 71045 X-RAY EXAM CHEST 1 VIEW: CPT

## 2020-08-01 PROCEDURE — 80053 COMPREHEN METABOLIC PANEL: CPT

## 2020-08-01 PROCEDURE — 99285 EMERGENCY DEPT VISIT HI MDM: CPT

## 2020-08-01 RX ORDER — LIDOCAINE HYDROCHLORIDE 20 MG/ML
15 SOLUTION OROPHARYNGEAL
Status: COMPLETED | OUTPATIENT
Start: 2020-08-01 | End: 2020-08-01

## 2020-08-01 RX ADMIN — NITROGLYCERIN 1 INCH: 20 OINTMENT TOPICAL at 05:36

## 2020-08-01 RX ADMIN — LIDOCAINE HYDROCHLORIDE 15 ML: 20 SOLUTION ORAL; TOPICAL at 07:11

## 2020-08-01 NOTE — DISCHARGE INSTRUCTIONS
Continue all your current medications  Call your doctor Monday for follow-up appointment  If you have any new or recurring symptoms please return immediately to the ER for further evaluation and care

## 2020-08-01 NOTE — ED TRIAGE NOTES
Patient presents from home with c/o waking up around 0200 with chest pain. She took half a Norco to try and help, but it did not help.  Patient masked on arrival.

## 2020-08-01 NOTE — ED PROVIDER NOTES
[facility] Emergency Department  Arrival Date/Time: 8/1/2020 @ Lian 43  MRN: [id number]      89 y.o. female    YOB: 1945   Telephone Information:   Mobile [phone] (home)     [facility] EMERGENCY DEPT ER07/07  Seen on 8/1/2020 @ 5:16 AM      Today's Chief Complaint:   Chief Complaint   Patient presents with    Chest Pain     HPI: 70-year-old female woke up around 2 or 230 with upper sternal chest pain. Felt like stabbing or grabbing pain. No radiation. She took half a Norco and went back to sleep. States she was floating between being asleep and being awake but continued to have pain    Decided to come to the emergency room for evaluation    No alleviating. No aggravating. Was a little short of breath. Maybe felt a little nauseated. Felt sweaty briefly. No history of coronary disease. HPI    Review of Systems: Review of Systems   Constitutional: Negative for activity change, appetite change, chills and diaphoresis. HENT: Negative. Respiratory: Negative for shortness of breath. Cardiovascular: Positive for chest pain. Negative for palpitations. Gastrointestinal: Negative for nausea and vomiting. Genitourinary: Negative. Musculoskeletal: Negative. Skin: Negative. Neurological: Negative. Psychiatric/Behavioral: Negative. Past Medical History: Primary Care Doctor: Ronak, MD Julien  Meds, PMH, PSHx, SocHx at end of this note     Allergies: Allergies   Allergen Reactions    Aspirin Nausea and Vomiting    Ciprofloxacin Nausea Only    Influenza Virus Vaccines Other (comments)    Lisinopril Swelling     Tongue swelling    Sulfa (Sulfonamide Antibiotics) Shortness of Breath         Key Anti-Platelet Anticoagulant Meds             aspirin 81 mg tablet Take 81 mg by mouth. Physical Exam:  Nursing documentation reviewed.      Patient Vitals for the past 24 hrs:   Temp Pulse Resp BP SpO2   08/01/20 0637  (!) 57 29 132/68 97 %   08/01/20 0617  (!) 59 20 157/78 96 %   08/01/20 0611  (!) 57 20  98 %   08/01/20 0539  60 18 144/68 99 %   08/01/20 0526     100 %   08/01/20 0522 98 °F (36.7 °C) 70 18 (!) 170/97 100 %   08/01/20 0521  68 20 (!) 170/97     Vital signs were reviewed. Physical Exam  Vitals signs and nursing note reviewed. Constitutional:       General: She is not in acute distress. Appearance: She is well-developed. She is not ill-appearing or toxic-appearing. HENT:      Head: Normocephalic and atraumatic. Eyes:      Extraocular Movements: Extraocular movements intact. Pupils: Pupils are equal, round, and reactive to light. Neck:      Musculoskeletal: Normal range of motion and neck supple. Cardiovascular:      Rate and Rhythm: Normal rate and regular rhythm. No extrasystoles are present. Heart sounds: Normal heart sounds. Heart sounds not distant. Pulmonary:      Effort: No tachypnea, accessory muscle usage or respiratory distress. Breath sounds: No decreased breath sounds or wheezing. Chest:      Chest wall: No tenderness. Abdominal:      Palpations: Abdomen is soft. Tenderness: There is abdominal tenderness. Comments: Mild ttp of the epigastrum and RUQ   Musculoskeletal: Normal range of motion. Right lower leg: She exhibits no tenderness. No edema. Left lower leg: She exhibits no tenderness. No edema. Skin:     General: Skin is warm. Capillary Refill: Capillary refill takes less than 2 seconds. Neurological:      Mental Status: She is alert and oriented to person, place, and time.    Psychiatric:         Mood and Affect: Mood normal.         Behavior: Behavior normal.         MEDICAL DECISION MAKING: (Including Differential Dx, and Plan)   MDM  Number of Diagnoses or Management Options  Atypical chest pain: new, needed workup  Diagnosis management comments: 70-year-old female presents to the emergency department with 3 hours of substernal chest pain. Sharp without radiation. No alleviating. No aggravating. EKG is read by me at 526 in the absence of a cardiologist.  Normal sinus rhythm. The ST and T waves are normal.  There is no elevation. The QTC is normal.    8:03 AM  Follow-up EKG reviewed  Normal sinus rhythm  ST-T waves are normal.  No acute ischemic changes  No interval change from prior study    8:56 AM  Troponin remains stable at 5.2  Patient reports feeling well and is anxious to go home    Encouraged her to continue all current medications and follow-up with her primary care provider on Monday       Amount and/or Complexity of Data Reviewed  Clinical lab tests: ordered and reviewed  Tests in the radiology section of CPT®: ordered and reviewed  Tests in the medicine section of CPT®: ordered and reviewed  Decide to obtain previous medical records or to obtain history from someone other than the patient: yes  Review and summarize past medical records: yes  Independent visualization of images, tracings, or specimens: yes    Risk of Complications, Morbidity, and/or Mortality  Presenting problems: moderate  Diagnostic procedures: moderate  Management options: moderate  General comments: Elements of this note have been dictated via voice recognition software. Text and phrases may be limited by the accuracy of the software. The chart has been reviewed, but errors may still be present.       Patient Progress  Patient progress: improved          Data/Management:  (.lion)   Lab findings during this visit:   Recent Results (from the past 48 hour(s))   CBC WITH AUTOMATED DIFF    Collection Time: 08/01/20  5:30 AM   Result Value Ref Range    WBC 10.3 4.3 - 11.1 K/uL    RBC 4.29 4.05 - 5.2 M/uL    HGB 13.5 11.7 - 15.4 g/dL    HCT 40.7 35.8 - 46.3 %    MCV 94.9 79.6 - 97.8 FL    MCH 31.5 26.1 - 32.9 PG    MCHC 33.2 31.4 - 35.0 g/dL    RDW 14.5 11.9 - 14.6 %    PLATELET 758 473 - 944 K/uL    MPV 9.1 (L) 9.4 - 12.3 FL    ABSOLUTE NRBC 0.00 0.0 - 0.2 K/uL    DF AUTOMATED      NEUTROPHILS 65 43 - 78 %    LYMPHOCYTES 27 13 - 44 %    MONOCYTES 8 4.0 - 12.0 %    EOSINOPHILS 1 0.5 - 7.8 %    BASOPHILS 0 0.0 - 2.0 %    IMMATURE GRANULOCYTES 0 0.0 - 5.0 %    ABS. NEUTROPHILS 6.6 1.7 - 8.2 K/UL    ABS. LYMPHOCYTES 2.8 0.5 - 4.6 K/UL    ABS. MONOCYTES 0.8 0.1 - 1.3 K/UL    ABS. EOSINOPHILS 0.1 0.0 - 0.8 K/UL    ABS. BASOPHILS 0.0 0.0 - 0.2 K/UL    ABS. IMM. GRANS. 0.0 0.0 - 0.5 K/UL   METABOLIC PANEL, COMPREHENSIVE    Collection Time: 08/01/20  5:30 AM   Result Value Ref Range    Sodium 139 136 - 145 mmol/L    Potassium 4.1 3.5 - 5.1 mmol/L    Chloride 105 98 - 107 mmol/L    CO2 32 21 - 32 mmol/L    Anion gap 2 (L) 7 - 16 mmol/L    Glucose 114 (H) 65 - 100 mg/dL    BUN 30 (H) 8 - 23 MG/DL    Creatinine 1.12 (H) 0.6 - 1.0 MG/DL    GFR est AA >60 >60 ml/min/1.73m2    GFR est non-AA 50 (L) >60 ml/min/1.73m2    Calcium 9.1 8.3 - 10.4 MG/DL    Bilirubin, total 0.3 0.2 - 1.1 MG/DL    ALT (SGPT) 32 12 - 65 U/L    AST (SGOT) 22 15 - 37 U/L    Alk. phosphatase 80 50 - 130 U/L    Protein, total 7.4 6.3 - 8.2 g/dL    Albumin 3.7 3.2 - 4.6 g/dL    Globulin 3.7 (H) 2.3 - 3.5 g/dL    A-G Ratio 1.0 (L) 1.2 - 3.5     TROPONIN-HIGH SENSITIVITY    Collection Time: 08/01/20  5:30 AM   Result Value Ref Range    Troponin-High Sensitivity 5.2 0 - 14 pg/mL     Radiology studies during this visit: Xr Chest Port    Result Date: 8/1/2020  IMPRESSION: No acute process. Medications given in the ED:   Medications   lidocaine (XYLOCAINE) 2 % viscous solution 15 mL (has no administration in time range)   nitroglycerin (NITROBID) 2 % ointment 1 Inch (1 Inch Topical Given 8/1/20 0536)        Procedure Documentation:   Procedures     Recheck and Additional Documentation:  (use .addrecheck  . addsepsis   . addstroke   . addhip  . addhandoff  . addcctime . emergcnt )     Recheck at 630. Patient resting comfortably.   Pain is much improved nearly resolved    Initial EKG showed a normal sinus rhythm with no ST or T wave changes. Her initial troponin was 5. She is not a smoker. Not diabetic. I think she is in a low risk group of people. We will get a 2-hour troponin and EKG if those are both normal then she can likely be discharged home    Care of this patient will be handed off to my oncoming partner Dr. Greta Woodruff. I wore appropriate PPE throughout this patient's ED encounter. Signed By: Vinicio Owen MD     August 1, 2020      Other ED Course Notes:           Past Medical History:      Past Medical History:   Diagnosis Date    Arthritis     Hypercholesteremia     Hypertension     Multinodular goiter     Postsurgical hypothyroidism      Past Surgical History:   Procedure Laterality Date    HX CARPAL TUNNEL RELEASE Bilateral     HX HYSTERECTOMY      HX KNEE REPLACEMENT Bilateral     HX OOPHORECTOMY Bilateral     HX ROTATOR CUFF REPAIR Left     HX SHOULDER REPLACEMENT Right     HX THYROIDECTOMY  ~1987     Social History     Tobacco Use    Smoking status: Current Every Day Smoker     Packs/day: 0.50    Smokeless tobacco: Never Used   Substance Use Topics    Alcohol use: No    Drug use: No     Cannot display prior to admission medications because the patient has not been admitted in this contact.

## 2020-08-01 NOTE — ED NOTES
I have reviewed discharge instructions with the patient. The patient verbalized understanding. Patient left ED via Discharge Method: ambulatory to Home with (self). Opportunity for questions and clarification provided. Patient given 0 scripts. To continue your aftercare when you leave the hospital, you may receive an automated call from our care team to check in on how you are doing. This is a free service and part of our promise to provide the best care and service to meet your aftercare needs.  If you have questions, or wish to unsubscribe from this service please call [phone]. Thank you for Choosing our [facility] Emergency Department.

## 2020-08-01 NOTE — ED NOTES
Second EKG and troponin being tested at this time. Pt remains on monitors x3 and waiting for further orders.

## 2021-05-13 NOTE — PROGRESS NOTES
10/09/19 2200   Dual Skin Pressure Injury Assessment   Dual Skin Pressure Injury Assessment WDL   Second Care Provider (Based on [address] [place] [address]) Pa Collins RN   Skin Integumentary   Skin Integumentary (WDL) WDL   Skin Color Appropriate for ethnicity   Skin Condition/Temp Dry; Warm   Skin Integrity Intact   Turgor Non-tenting   Hair Growth Present   Varicosities Absent     Primary Nurse Anitra Burns RN and Pa Collins RN performed a dual skin assessment on this patient No impairment noted. Patient oriented to room and call light. All needs met at this time. Will continue to monitor. Chriss score is 20. Wife

## 2021-07-13 NOTE — DISCHARGE SUMMARY
Discharge Summary     Patient: Kacie Keller MRN: [id number]  SSN: [id number]    YOB: 1945  Age: 76 y.o. Sex: female       Admit Date: 10/9/2019    Discharge Date: 10/13/2019      Admission Diagnoses: Diverticulitis of intestine with abscess [K57.80]    Discharge Diagnoses:   Problem List as of 10/13/2019 Date Reviewed: 1/29/2018          Codes Class Noted - Resolved    * (Principal) Diverticulitis of intestine with abscess ICD-10-CM: K57.80  ICD-9-CM: 562.11, 569.5  10/9/2019 - Present        Postsurgical hypothyroidism ICD-10-CM: E89.0  ICD-9-CM: 244.0  Unknown - Present        Multinodular goiter ICD-10-CM: E04.2  ICD-9-CM: 241.1  Unknown - Present        Shortness of breath ICD-10-CM: R06.02  ICD-9-CM: 786.05  12/28/2017 - Present        Pure hypercholesterolemia ICD-10-CM: E78.00  ICD-9-CM: 272.0  12/28/2017 - Present        Precordial pain ICD-10-CM: R07.2  ICD-9-CM: 786.51  12/26/2017 - Present        Hypertension ICD-10-CM: I10  ICD-9-CM: 401.9  12/26/2017 - Present               Discharge Condition: [facility] Course:   Ms. Shelly Alcantara is a 76years old F with PMH of HTN, hypothyroidism presented to the hospital complaining of lower abdominal pain. She visited an outside urgent care center with no resolution of her symptoms. In the ED CT AP showed an acute distal sigmoid diverticulitis probably complicated by focal perforation and a small diverticular abscess. She was initially at [facility] and after general surgery was contacted she was transferred here for continuation of care and IR assessment. She was started on IVF, antibiotics. Blood cultures were negative. She was not a surgical case in view of the small size of the abscess and clinical picture. IR did not recommended any procedure since the location and size of the abscess was limited access. Her clinical status did okay. She remained afebrile, with no leukocytosis. Her pain resolved and she was able to tolerate regular diet. VS obtained, assessments completed, HS meds and snack given, pt cleaned of incontinence, bed in lowest position floor mat in place. She is stable enough to be discharged and continue outpatient management,  She should continue po antibiotics x 12 more days and follow up with g surgery. Physical Exam:   GENERAL: alert, cooperative, no distress, appears stated age  EYE: negative  LYMPHATIC: Cervical, supraclavicular, and axillary nodes normal.   THROAT & NECK: normal and no erythema or exudates noted. LUNG: clear to auscultation bilaterally  HEART: regular rate and rhythm, S1, S2 normal, no murmur, click, rub or gallop  ABDOMEN: soft, tenderness to touch over lower abdomen, right side more than left. Bowel sounds normal. No masses,  no organomegaly. No rebound   EXTREMITIES:  extremities normal, atraumatic, no cyanosis or edema  SKIN: Normal.  NEUROLOGIC: negative  PSYCHIATRIC: non focal    Consults: General Surgery    Significant Diagnostic Studies: see note     Disposition: home    Discharge Medications:   Current Discharge Medication List      START taking these medications    Details   !! HYDROcodone-acetaminophen (NORCO) 7.5-325 mg per tablet Take 1 Tab by mouth every eight (8) hours as needed for Pain for up to 7 days. Max Daily Amount: 3 Tabs. Qty: 15 Tab, Refills: 0    Associated Diagnoses: Diverticulitis of large intestine with abscess without bleeding      ciprofloxacin HCl (CIPRO) 500 mg tablet Take 1 Tab by mouth two (2) times a day for 12 days. Qty: 24 Tab, Refills: 0      metroNIDAZOLE (FLAGYL) 500 mg tablet Take 1 Tab by mouth three (3) times daily for 12 days. Qty: 36 Tab, Refills: 0       !! - Potential duplicate medications found. Please discuss with provider. CONTINUE these medications which have NOT CHANGED    Details   !! HYDROcodone-acetaminophen (NORCO) 7.5-325 mg per tablet Take 1-2 tabs orally at night prn pain      Cyanocobalamin-Cobamamide 5,000-100 mcg lozg Take 1 Tab by mouth. carvedilol (COREG) 12.5 mg tablet       diclofenac (VOLTAREN) 1 % gel Apply topically 2 (two) times a day as needed (pain). docusate sodium (COLACE) 100 mg capsule Take 100 mg by mouth.      levothyroxine (SYNTHROID) 100 mcg tablet Take 100 mcg by mouth.      multivitamin (ONE A DAY) tablet 1 PO DAILY      nystatin (MYCOSTATIN) 100,000 unit/mL suspension Take 500,000 Units by mouth.      polyethylene glycol (MIRALAX) 17 gram/dose powder 527GM 1 SCOOP DAILY      ascorbate calcium (VITAMIN C PO) Take  by mouth. omeprazole (PRILOSEC) 20 mg capsule Take 20 mg by mouth daily. pravastatin (PRAVACHOL) 40 mg tablet Take 40 mg by mouth nightly. hydroCHLOROthiazide (HYDRODIURIL) 25 mg tablet Take 25 mg by mouth daily. amLODIPine (NORVASC) 10 mg tablet Take  by mouth daily. aspirin 81 mg tablet Take 81 mg by mouth.        zolpidem (AMBIEN) 10 mg tablet Take  by mouth nightly as needed. !! - Potential duplicate medications found. Please discuss with provider. Activity: Activity as tolerated  Diet: Regular Diet - high fiber   Wound Care: None needed    Follow-up Appointments   Procedures    FOLLOW UP VISIT Appointment in: One Week pcp     pcp     Standing Status:   Standing     Number of Occurrences:   1     Order Specific Question:   Appointment in     Answer: One Week    FOLLOW UP VISIT Appointment in: Two Weeks General surgery     General surgery     Standing Status:   Standing     Number of Occurrences:   1     Standing Expiration Date:   10/14/2019     Order Specific Question:   Appointment in     Answer:    Two Weeks       Signed By: Kristy Narayanan MD     October 13, 2019

## 2021-11-17 PROBLEM — T84.50XA INFECTION AND INFLAMMATORY REACTION DUE TO INTERNAL JOINT PROSTHESIS (HCC): Status: ACTIVE | Noted: 2021-11-17

## 2021-11-17 PROBLEM — Z96.612 STATUS POST REVERSE TOTAL SHOULDER REPLACEMENT, LEFT: Status: ACTIVE | Noted: 2021-11-17

## 2021-11-23 ENCOUNTER — HOSPITAL ENCOUNTER (OUTPATIENT)
Dept: GENERAL RADIOLOGY | Age: 76
Discharge: HOME | End: 2021-11-23
Attending: ORTHOPAEDIC SURGERY
Payer: MEDICARE

## 2021-11-23 ENCOUNTER — HOSPITAL ENCOUNTER (OUTPATIENT)
Dept: LAB | Age: 76
Discharge: HOME | End: 2021-11-23
Attending: ORTHOPAEDIC SURGERY
Payer: MEDICARE

## 2021-11-23 VITALS
DIASTOLIC BLOOD PRESSURE: 83 MMHG | SYSTOLIC BLOOD PRESSURE: 149 MMHG | RESPIRATION RATE: 16 BRPM | HEART RATE: 60 BPM | TEMPERATURE: 98.5 F

## 2021-11-23 DIAGNOSIS — T84.50XA INFECTION OR INFLAMMATORY REACTION DUE TO INTERNAL JOINT PROSTHESIS, INITIAL ENCOUNTER (HCC): ICD-10-CM

## 2021-11-23 DIAGNOSIS — Z96.612 STATUS POST REVERSE TOTAL SHOULDER REPLACEMENT, LEFT: ICD-10-CM

## 2021-11-23 DIAGNOSIS — M25.512 LEFT SHOULDER PAIN, UNSPECIFIED CHRONICITY: ICD-10-CM

## 2021-11-23 LAB
APPEARANCE FLD: NORMAL
BASOPHILS # BLD: 0 K/UL (ref 0–0.2)
BASOPHILS NFR BLD: 1 % (ref 0–2)
COLOR FLD: NORMAL
CREAT SERPL-MCNC: 1.11 MG/DL (ref 0.6–1)
CRP SERPL-MCNC: <0.3 MG/DL (ref 0–0.9)
DIFFERENTIAL METHOD BLD: ABNORMAL
EOSINOPHIL # BLD: 0.2 K/UL (ref 0–0.8)
EOSINOPHIL NFR BLD: 3 % (ref 0.5–7.8)
EOSINOPHIL NFR BRONCH MANUAL: 2 %
ERYTHROCYTE [DISTWIDTH] IN BLOOD BY AUTOMATED COUNT: 14.5 % (ref 11.9–14.6)
ERYTHROCYTE [SEDIMENTATION RATE] IN BLOOD: 22 MM/HR (ref 0–30)
HCT VFR BLD AUTO: 36.8 % (ref 35.8–46.3)
HGB BLD-MCNC: 11.7 G/DL (ref 11.7–15.4)
IMM GRANULOCYTES # BLD AUTO: 0 K/UL (ref 0–0.5)
IMM GRANULOCYTES NFR BLD AUTO: 0 % (ref 0–5)
LYMPHOCYTES # BLD: 2.5 K/UL (ref 0.5–4.6)
LYMPHOCYTES NFR BLD: 36 % (ref 13–44)
LYMPHOCYTES NFR BRONCH MANUAL: 35 %
MACROPHAGES NFR BRONCH MANUAL: 11 %
MCH RBC QN AUTO: 30.4 PG (ref 26.1–32.9)
MCHC RBC AUTO-ENTMCNC: 31.8 G/DL (ref 31.4–35)
MCV RBC AUTO: 95.6 FL (ref 79.6–97.8)
MONOCYTES # BLD: 0.6 K/UL (ref 0.1–1.3)
MONOCYTES NFR BLD: 9 % (ref 4–12)
NEUTROPHILS NFR BRONCH MANUAL: 52 %
NEUTS SEG # BLD: 3.7 K/UL (ref 1.7–8.2)
NEUTS SEG NFR BLD: 52 % (ref 43–78)
NRBC # BLD: 0 K/UL (ref 0–0.2)
NUC CELL # FLD: NORMAL /CU MM
PLATELET # BLD AUTO: 270 K/UL (ref 150–450)
PMV BLD AUTO: 9.2 FL (ref 9.4–12.3)
RBC # BLD AUTO: 3.85 M/UL (ref 4.05–5.2)
RBC # FLD: NORMAL /CU MM
SPECIMEN SOURCE FLD: NORMAL
WBC # BLD AUTO: 7 K/UL (ref 4.3–11.1)

## 2021-11-23 PROCEDURE — 85025 COMPLETE CBC W/AUTO DIFF WBC: CPT

## 2021-11-23 PROCEDURE — 87205 SMEAR GRAM STAIN: CPT

## 2021-11-23 PROCEDURE — 87075 CULTR BACTERIA EXCEPT BLOOD: CPT

## 2021-11-23 PROCEDURE — 89050 BODY FLUID CELL COUNT: CPT

## 2021-11-23 PROCEDURE — 85652 RBC SED RATE AUTOMATED: CPT

## 2021-11-23 PROCEDURE — 86140 C-REACTIVE PROTEIN: CPT

## 2021-11-23 PROCEDURE — 74011000250 HC RX REV CODE- 250: Performed by: ORTHOPAEDIC SURGERY

## 2021-11-23 PROCEDURE — 36415 COLL VENOUS BLD VENIPUNCTURE: CPT

## 2021-11-23 PROCEDURE — 20610 DRAIN/INJ JOINT/BURSA W/O US: CPT

## 2021-11-23 PROCEDURE — 82565 ASSAY OF CREATININE: CPT

## 2021-11-23 RX ORDER — LIDOCAINE HYDROCHLORIDE 20 MG/ML
2 INJECTION, SOLUTION INFILTRATION; PERINEURAL
Status: COMPLETED | OUTPATIENT
Start: 2021-11-23 | End: 2021-11-23

## 2021-11-23 RX ADMIN — LIDOCAINE HYDROCHLORIDE 40 MG: 20 INJECTION, SOLUTION INFILTRATION; PERINEURAL at 13:42

## 2021-11-25 LAB
BACTERIA SPEC CULT: NORMAL
GRAM STN SPEC: NORMAL
GRAM STN SPEC: NORMAL
SERVICE CMNT-IMP: NORMAL

## 2021-12-01 LAB
BACTERIA SPEC CULT: NORMAL
SERVICE CMNT-IMP: NORMAL

## 2021-12-17 PROBLEM — M19.012 PRIMARY OSTEOARTHRITIS OF LEFT SHOULDER: Status: ACTIVE | Noted: 2020-08-18

## 2021-12-17 PROBLEM — E11.65 HYPERGLYCEMIA DUE TO TYPE 2 DIABETES MELLITUS (HCC): Status: ACTIVE | Noted: 2021-12-17

## 2021-12-17 PROBLEM — Z96.652 S/P TOTAL KNEE REPLACEMENT USING CEMENT, LEFT: Status: ACTIVE | Noted: 2018-07-10

## 2021-12-17 PROBLEM — R13.10 DYSPHAGIA: Status: ACTIVE | Noted: 2021-12-17

## 2021-12-17 PROBLEM — R41.3 SHORT-TERM MEMORY LOSS: Status: ACTIVE | Noted: 2019-01-21

## 2021-12-17 PROBLEM — E04.1 THYROID NODULE: Status: ACTIVE | Noted: 2021-12-17

## 2021-12-17 PROBLEM — M17.12 PRIMARY OSTEOARTHRITIS OF LEFT KNEE: Status: ACTIVE | Noted: 2018-06-18

## 2021-12-17 PROBLEM — R41.3 MEMORY LOSS: Status: ACTIVE | Noted: 2021-12-17

## 2021-12-17 PROBLEM — M48.061 SPINAL STENOSIS OF LUMBAR REGION WITHOUT NEUROGENIC CLAUDICATION: Status: ACTIVE | Noted: 2020-06-29

## 2021-12-17 PROBLEM — E78.2 MIXED HYPERLIPIDEMIA: Status: ACTIVE | Noted: 2017-12-28

## 2021-12-17 PROBLEM — G47.01 INSOMNIA DUE TO MEDICAL CONDITION: Status: ACTIVE | Noted: 2019-03-19

## 2021-12-17 PROBLEM — K58.9 IRRITABLE BOWEL SYNDROME: Status: ACTIVE | Noted: 2021-12-17

## 2021-12-17 PROBLEM — R30.0 DYSURIA: Status: ACTIVE | Noted: 2019-10-07

## 2021-12-17 PROBLEM — E03.9 ACQUIRED HYPOTHYROIDISM: Status: ACTIVE | Noted: 2018-06-25

## 2021-12-17 PROBLEM — R73.03 PREDIABETES: Status: ACTIVE | Noted: 2021-12-17

## 2021-12-17 PROBLEM — R41.89 PSEUDODEMENTIA: Status: ACTIVE | Noted: 2019-03-19

## 2021-12-17 PROBLEM — G47.33 OSA (OBSTRUCTIVE SLEEP APNEA): Status: ACTIVE | Noted: 2018-03-28

## 2021-12-17 PROBLEM — E66.3 OVER WEIGHT: Status: ACTIVE | Noted: 2020-01-06

## 2021-12-17 PROBLEM — G89.4 CHRONIC PAIN DISORDER: Status: ACTIVE | Noted: 2019-03-19

## 2021-12-17 PROBLEM — F32.A ACUTE DEPRESSION: Status: ACTIVE | Noted: 2019-01-21

## 2021-12-17 PROBLEM — R20.2 PARESTHESIA: Status: ACTIVE | Noted: 2021-12-17

## 2021-12-17 PROBLEM — E04.9 ENLARGEMENT OF THYROID: Status: ACTIVE | Noted: 2021-12-17

## 2021-12-17 PROBLEM — E11.9 TYPE 2 DIABETES MELLITUS, WITHOUT LONG-TERM CURRENT USE OF INSULIN (HCC): Status: ACTIVE | Noted: 2021-07-23

## 2021-12-17 PROBLEM — K21.9 GASTROESOPHAGEAL REFLUX DISEASE WITHOUT ESOPHAGITIS: Status: ACTIVE | Noted: 2021-12-17

## 2021-12-17 PROBLEM — F43.20 ADJUSTMENT REACTION: Status: ACTIVE | Noted: 2019-03-19

## 2021-12-17 PROBLEM — E78.5 DYSLIPIDEMIA: Status: ACTIVE | Noted: 2021-12-17

## 2021-12-17 PROBLEM — G47.9 SLEEP DISORDER: Status: ACTIVE | Noted: 2019-01-21

## 2021-12-17 PROBLEM — M19.91 PRIMARY OSTEOARTHRITIS: Status: ACTIVE | Noted: 2021-12-17

## 2021-12-20 PROBLEM — M25.512 LEFT SHOULDER PAIN: Status: ACTIVE | Noted: 2021-12-20

## 2022-03-18 PROBLEM — E04.9 ENLARGEMENT OF THYROID: Status: ACTIVE | Noted: 2021-12-17

## 2022-03-18 PROBLEM — G47.33 OSA (OBSTRUCTIVE SLEEP APNEA): Status: ACTIVE | Noted: 2018-03-28

## 2022-03-18 PROBLEM — R73.03 PREDIABETES: Status: ACTIVE | Noted: 2021-12-17

## 2022-03-18 PROBLEM — R41.3 MEMORY LOSS: Status: ACTIVE | Noted: 2021-12-17

## 2022-03-18 PROBLEM — R30.0 DYSURIA: Status: ACTIVE | Noted: 2019-10-07

## 2022-03-18 PROBLEM — G47.9 SLEEP DISORDER: Status: ACTIVE | Noted: 2019-01-21

## 2022-03-18 PROBLEM — E11.9 TYPE 2 DIABETES MELLITUS, WITHOUT LONG-TERM CURRENT USE OF INSULIN (HCC): Status: ACTIVE | Noted: 2021-07-23

## 2022-03-18 PROBLEM — K58.9 IRRITABLE BOWEL SYNDROME: Status: ACTIVE | Noted: 2021-12-17

## 2022-03-18 PROBLEM — E78.5 DYSLIPIDEMIA: Status: ACTIVE | Noted: 2021-12-17

## 2022-03-18 PROBLEM — E11.65 HYPERGLYCEMIA DUE TO TYPE 2 DIABETES MELLITUS (HCC): Status: ACTIVE | Noted: 2021-12-17

## 2022-03-18 PROBLEM — R06.02 SHORTNESS OF BREATH: Status: ACTIVE | Noted: 2017-12-28

## 2022-03-18 PROBLEM — G47.01 INSOMNIA DUE TO MEDICAL CONDITION: Status: ACTIVE | Noted: 2019-03-19

## 2022-03-19 PROBLEM — R41.89 PSEUDODEMENTIA: Status: ACTIVE | Noted: 2019-03-19

## 2022-03-19 PROBLEM — M25.512 LEFT SHOULDER PAIN: Status: ACTIVE | Noted: 2021-12-20

## 2022-03-19 PROBLEM — T84.50XA INFECTION AND INFLAMMATORY REACTION DUE TO INTERNAL JOINT PROSTHESIS (HCC): Status: ACTIVE | Noted: 2021-11-17

## 2022-03-19 PROBLEM — M17.12 PRIMARY OSTEOARTHRITIS OF LEFT KNEE: Status: ACTIVE | Noted: 2018-06-18

## 2022-03-19 PROBLEM — G89.4 CHRONIC PAIN DISORDER: Status: ACTIVE | Noted: 2019-03-19

## 2022-03-19 PROBLEM — E66.3 OVER WEIGHT: Status: ACTIVE | Noted: 2020-01-06

## 2022-03-19 PROBLEM — M19.91 PRIMARY OSTEOARTHRITIS: Status: ACTIVE | Noted: 2021-12-17

## 2022-03-19 PROBLEM — R20.2 PARESTHESIA: Status: ACTIVE | Noted: 2021-12-17

## 2022-03-19 PROBLEM — R41.3 SHORT-TERM MEMORY LOSS: Status: ACTIVE | Noted: 2019-01-21

## 2022-03-19 PROBLEM — Z96.652 S/P TOTAL KNEE REPLACEMENT USING CEMENT, LEFT: Status: ACTIVE | Noted: 2018-07-10

## 2022-03-19 PROBLEM — E03.9 ACQUIRED HYPOTHYROIDISM: Status: ACTIVE | Noted: 2018-06-25

## 2022-03-19 PROBLEM — F32.A ACUTE DEPRESSION: Status: ACTIVE | Noted: 2019-01-21

## 2022-03-19 PROBLEM — K57.80 DIVERTICULITIS OF INTESTINE WITH ABSCESS: Status: ACTIVE | Noted: 2019-10-09

## 2022-03-19 PROBLEM — R07.2 PRECORDIAL PAIN: Status: ACTIVE | Noted: 2017-12-26

## 2022-03-19 PROBLEM — R13.10 DYSPHAGIA: Status: ACTIVE | Noted: 2021-12-17

## 2022-03-19 PROBLEM — E78.2 MIXED HYPERLIPIDEMIA: Status: ACTIVE | Noted: 2017-12-28

## 2022-03-20 PROBLEM — K21.9 GASTROESOPHAGEAL REFLUX DISEASE WITHOUT ESOPHAGITIS: Status: ACTIVE | Noted: 2021-12-17

## 2022-03-20 PROBLEM — F43.20 ADJUSTMENT REACTION: Status: ACTIVE | Noted: 2019-03-19

## 2022-03-20 PROBLEM — Z96.612 STATUS POST REVERSE TOTAL SHOULDER REPLACEMENT, LEFT: Status: ACTIVE | Noted: 2021-11-17

## 2022-03-20 PROBLEM — M19.012 PRIMARY OSTEOARTHRITIS OF LEFT SHOULDER: Status: ACTIVE | Noted: 2020-08-18

## 2022-03-20 PROBLEM — M48.061 SPINAL STENOSIS OF LUMBAR REGION WITHOUT NEUROGENIC CLAUDICATION: Status: ACTIVE | Noted: 2020-06-29

## 2022-03-20 PROBLEM — E04.1 THYROID NODULE: Status: ACTIVE | Noted: 2021-12-17

## 2022-03-20 PROBLEM — I10 ESSENTIAL HYPERTENSION: Status: ACTIVE | Noted: 2017-12-26

## 2022-09-06 ENCOUNTER — HOSPITAL ENCOUNTER (EMERGENCY)
Age: 77
Discharge: HOME | End: 2022-09-06
Attending: EMERGENCY MEDICINE
Payer: MEDICARE

## 2022-09-06 ENCOUNTER — HOSPITAL ENCOUNTER (EMERGENCY)
Dept: CT IMAGING | Age: 77
Discharge: HOME | End: 2022-09-09
Payer: MEDICARE

## 2022-09-06 VITALS
WEIGHT: 137.6 LBS | RESPIRATION RATE: 14 BRPM | SYSTOLIC BLOOD PRESSURE: 195 MMHG | DIASTOLIC BLOOD PRESSURE: 92 MMHG | BODY MASS INDEX: 25.32 KG/M2 | HEART RATE: 79 BPM | TEMPERATURE: 98.9 F | HEIGHT: 62 IN | OXYGEN SATURATION: 96 %

## 2022-09-06 DIAGNOSIS — G44.89 OTHER HEADACHE SYNDROME: Primary | ICD-10-CM

## 2022-09-06 LAB
ALBUMIN SERPL-MCNC: 3.8 G/DL (ref 3.2–4.6)
ALBUMIN/GLOB SERPL: 1 {RATIO} (ref 1.2–3.5)
ALP SERPL-CCNC: 88 U/L (ref 50–130)
ALT SERPL-CCNC: 19 U/L (ref 12–65)
ANION GAP SERPL CALC-SCNC: 5 MMOL/L (ref 4–13)
AST SERPL-CCNC: 18 U/L (ref 15–37)
BASOPHILS # BLD: 0.1 K/UL (ref 0–0.2)
BASOPHILS NFR BLD: 1 % (ref 0–2)
BILIRUB SERPL-MCNC: 0.2 MG/DL (ref 0.2–1.1)
BUN SERPL-MCNC: 23 MG/DL (ref 8–23)
CALCIUM SERPL-MCNC: 9.3 MG/DL (ref 8.3–10.4)
CHLORIDE SERPL-SCNC: 110 MMOL/L (ref 101–110)
CO2 SERPL-SCNC: 27 MMOL/L (ref 21–32)
CREAT SERPL-MCNC: 1.06 MG/DL (ref 0.6–1)
CRP SERPL-MCNC: <0.3 MG/DL (ref 0–0.9)
DIFFERENTIAL METHOD BLD: ABNORMAL
EOSINOPHIL # BLD: 0.1 K/UL (ref 0–0.8)
EOSINOPHIL NFR BLD: 2 % (ref 0.5–7.8)
ERYTHROCYTE [DISTWIDTH] IN BLOOD BY AUTOMATED COUNT: 14.6 % (ref 11.9–14.6)
ERYTHROCYTE [SEDIMENTATION RATE] IN BLOOD: 18 MM/HR (ref 0–30)
GLOBULIN SER CALC-MCNC: 3.9 G/DL (ref 2.3–3.5)
GLUCOSE SERPL-MCNC: 152 MG/DL (ref 65–100)
HCT VFR BLD AUTO: 38.5 % (ref 35.8–46.3)
HGB BLD-MCNC: 12.2 G/DL (ref 11.7–15.4)
IMM GRANULOCYTES # BLD AUTO: 0 K/UL (ref 0–0.5)
IMM GRANULOCYTES NFR BLD AUTO: 0 % (ref 0–5)
LYMPHOCYTES # BLD: 2.4 K/UL (ref 0.5–4.6)
LYMPHOCYTES NFR BLD: 35 % (ref 13–44)
MCH RBC QN AUTO: 31 PG (ref 26.1–32.9)
MCHC RBC AUTO-ENTMCNC: 31.7 G/DL (ref 31.4–35)
MCV RBC AUTO: 98 FL (ref 79.6–97.8)
MONOCYTES # BLD: 0.5 K/UL (ref 0.1–1.3)
MONOCYTES NFR BLD: 8 % (ref 4–12)
NEUTS SEG # BLD: 3.8 K/UL (ref 1.7–8.2)
NEUTS SEG NFR BLD: 54 % (ref 43–78)
NRBC # BLD: 0 K/UL (ref 0–0.2)
PLATELET # BLD AUTO: 254 K/UL (ref 150–450)
PMV BLD AUTO: 8.9 FL (ref 9.4–12.3)
POTASSIUM SERPL-SCNC: 3.8 MMOL/L (ref 3.5–5.1)
PROT SERPL-MCNC: 7.7 G/DL (ref 6.3–8.2)
RBC # BLD AUTO: 3.93 M/UL (ref 4.05–5.2)
SARS-COV-2 RDRP RESP QL NAA+PROBE: NOT DETECTED
SODIUM SERPL-SCNC: 142 MMOL/L (ref 136–145)
SOURCE: NORMAL
WBC # BLD AUTO: 6.9 K/UL (ref 4.3–11.1)

## 2022-09-06 PROCEDURE — 99284 EMERGENCY DEPT VISIT MOD MDM: CPT

## 2022-09-06 PROCEDURE — 80053 COMPREHEN METABOLIC PANEL: CPT

## 2022-09-06 PROCEDURE — 6360000002 HC RX W HCPCS: Performed by: EMERGENCY MEDICINE

## 2022-09-06 PROCEDURE — 85652 RBC SED RATE AUTOMATED: CPT

## 2022-09-06 PROCEDURE — 86140 C-REACTIVE PROTEIN: CPT

## 2022-09-06 PROCEDURE — 96374 THER/PROPH/DIAG INJ IV PUSH: CPT

## 2022-09-06 PROCEDURE — 85025 COMPLETE CBC W/AUTO DIFF WBC: CPT

## 2022-09-06 PROCEDURE — 70450 CT HEAD/BRAIN W/O DYE: CPT

## 2022-09-06 PROCEDURE — 87635 SARS-COV-2 COVID-19 AMP PRB: CPT

## 2022-09-06 RX ORDER — MELOXICAM 15 MG/1
15 TABLET ORAL DAILY
Qty: 30 TABLET | Refills: 0 | Status: SHIPPED | OUTPATIENT
Start: 2022-09-06

## 2022-09-06 RX ORDER — KETOROLAC TROMETHAMINE 15 MG/ML
15 INJECTION, SOLUTION INTRAMUSCULAR; INTRAVENOUS
Status: COMPLETED | OUTPATIENT
Start: 2022-09-06 | End: 2022-09-06

## 2022-09-06 RX ADMIN — KETOROLAC TROMETHAMINE 15 MG: 15 INJECTION, SOLUTION INTRAMUSCULAR; INTRAVENOUS at 22:07

## 2022-09-06 ASSESSMENT — PAIN SCALES - GENERAL
PAINLEVEL_OUTOF10: 9
PAINLEVEL_OUTOF10: 6

## 2022-09-06 ASSESSMENT — PAIN DESCRIPTION - DESCRIPTORS: DESCRIPTORS: PATIENT UNABLE TO DESCRIBE

## 2022-09-06 ASSESSMENT — ENCOUNTER SYMPTOMS
VISUAL CHANGE: 0
BLURRED VISION: 0
SORE THROAT: 0
NAUSEA: 0
TINGLING: 0
BACK PAIN: 0
SINUS PRESSURE: 0
PHOTOPHOBIA: 0
ABDOMINAL PAIN: 0
EYE PAIN: 0
SWOLLEN GLANDS: 0
DIARRHEA: 0
COUGH: 0
VOMITING: 0

## 2022-09-06 ASSESSMENT — PAIN DESCRIPTION - LOCATION: LOCATION: HEAD

## 2022-09-06 ASSESSMENT — PAIN - FUNCTIONAL ASSESSMENT: PAIN_FUNCTIONAL_ASSESSMENT: 0-10

## 2022-09-06 NOTE — ED PROVIDER NOTES
Emergency Department Provider Note                   PCP:                No primary care provider on file. Age: 68 y.o. Sex: female     No diagnosis found. DISPOSITION          MDM  Number of Diagnoses or Management Options  Diagnosis management comments: Test results reviewed: CTs negative for acute intracranial pathology. Laboratory data within normal limits. Will add CRP and sed rate to evaluate for potential giant cell arteritis etiology, will also check COVID. Amount and/or Complexity of Data Reviewed  Clinical lab tests: ordered and reviewed  Tests in the radiology section of CPT®: ordered and reviewed  Independent visualization of images, tracings, or specimens: yes    Risk of Complications, Morbidity, and/or Mortality  Presenting problems: moderate  Diagnostic procedures: moderate  Management options: moderate    Patient Progress  Patient progress: stable       Orders Placed This Encounter   Procedures    CT HEAD WO CONTRAST    CBC with Auto Differential    Comprehensive Metabolic Panel    Insert peripheral IV        Medications - No data to display    New Prescriptions    No medications on file        Arin Weber is a 68 y.o. female who presents to the Emergency Department with chief complaint of    Chief Complaint   Patient presents with    Headache    Dizziness    Fatigue      80-year-old black female with history of depression, HTN, hypercholesterolemia and postsurgical hypothyroidism presents the emergency department complaining of 3-day history of generalized headache, generalized weakness, dizziness, and fatigue. Patient denies visual changes, paralysis or paresthesias. Denies nausea or vomiting. Further history: Patient describes dizziness as a pressure-like sensation in her head. She has had 2 doses of Norco today the last being about 6 to 7 hours ago. She states that headache is currently about 5/10 in intensity.   Primarily pressure-like and in the frontal area. She denies nausea. She has had no fever although she did have some nausea when the headache was severe 3 days ago. She reports having had headache syndromes since she was 8years old although nothing like this previously. She denies any upper respiratory infection symptoms. She does have generalized myalgias and arthralgias but reports a history of chronic arthritis. The history is provided by the patient and medical records. Headache  Pain location:  Frontal  Quality:  Dull  Radiates to:  Does not radiate  Severity currently:  6/10  Severity at highest:  6/10  Onset quality:  Unable to specify  Duration:  3 days  Timing:  Constant  Progression:  Waxing and waning  Chronicity:  New  Similar to prior headaches: yes    Relieved by:  Nothing  Worsened by:  Nothing  Ineffective treatments:  Resting in a darkened room  Associated symptoms: no abdominal pain, no back pain, no blurred vision, no congestion, no cough, no diarrhea, no dizziness, no drainage, no ear pain, no eye pain, no facial pain, no fatigue, no fever, no focal weakness, no hearing loss, no loss of balance, no myalgias, no nausea, no near-syncope, no neck pain, no neck stiffness, no numbness, no paresthesias, no photophobia, no seizures, no sinus pressure, no sore throat, no swollen glands, no syncope, no tingling, no URI, no visual change, no vomiting and no weakness    Risk factors: sedentary lifestyle    Risk factors: no anger, no family hx of SAH and does not have insomnia        Review of Systems   Constitutional:  Negative for chills, fatigue and fever. HENT:  Negative for congestion, ear pain, hearing loss, postnasal drip, sinus pressure and sore throat. Eyes:  Negative for blurred vision, photophobia and pain. Respiratory:  Negative for cough. Cardiovascular:  Negative for syncope and near-syncope. Gastrointestinal:  Negative for abdominal pain, diarrhea, nausea and vomiting.    Musculoskeletal:  Negative for back pain, myalgias, neck pain and neck stiffness. Neurological:  Positive for headaches. Negative for dizziness, focal weakness, seizures, weakness, numbness, paresthesias and loss of balance. All other systems reviewed and are negative. Past Medical History:   Diagnosis Date    Acute depression 1/21/2019    Arthritis     Gastroesophageal reflux disease without esophagitis 12/17/2021    Hypercholesteremia     Hypertension     Insomnia due to medical condition 3/19/2019    Low back pain with sciatica 10/10/2016    Multinodular goiter     Postsurgical hypothyroidism         Past Surgical History:   Procedure Laterality Date    CARPAL TUNNEL RELEASE Bilateral     HYSTERECTOMY      OOPHORECTOMY Bilateral     ROTATOR CUFF REPAIR Left     THYROIDECTOMY      1987    TOTAL KNEE ARTHROPLASTY Bilateral     TOTAL SHOULDER ARTHROPLASTY Right         Family History   Problem Relation Age of Onset    Diabetes Maternal Aunt     Diabetes Maternal Aunt     Diabetes Maternal Aunt     Thyroid Disease Sister     Diabetes Maternal Grandmother     Coronary Art Dis Maternal Grandmother     Diabetes Maternal Uncle     Coronary Art Dis Maternal Uncle     Coronary Art Dis Maternal Aunt     Coronary Art Dis Father     Coronary Art Dis Mother     Sudden Death Mother     Thyroid Cancer Neg Hx     Cancer Neg Hx     Diabetes Maternal Uncle     Diabetes Maternal Aunt         Social History     Socioeconomic History    Marital status:     Tobacco Use    Smoking status: Every Day     Packs/day: 0.50     Types: Cigarettes    Smokeless tobacco: Never   Substance and Sexual Activity    Alcohol use: No    Drug use: No         Ace inhibitors, Sulfa antibiotics, Duloxetine, Dextrose, Aspirin, and Ciprofloxacin     Previous Medications    AMLODIPINE (NORVASC) 10 MG TABLET    Take by mouth daily    ASPIRIN 81 MG EC TABLET    4 tablet    BUPRENORPHINE HCL (BELBUCA) 150 MCG FILM    1 film to the gum    DICLOFENAC SODIUM (VOLTAREN) 1 % GEL Apply topically 2 (two) times a day as needed (pain). DOCUSATE (COLACE, DULCOLAX) 100 MG CAPS    Take 100 mg by mouth    HYDROCHLOROTHIAZIDE (HYDRODIURIL) 25 MG TABLET    Take 25 mg by mouth daily    HYDROCODONE-ACETAMINOPHEN (NORCO)  MG PER TABLET    1 tablet as needed    HYDROCODONE-ACETAMINOPHEN (NORCO) 7.5-325 MG PER TABLET    Take 1-2 tabs orally at night prn pain    LEVOTHYROXINE (SYNTHROID) 100 MCG TABLET    Take 100 mcg by mouth    MELATONIN 5 MG TABS TABLET    Take 5 mg by mouth    MELOXICAM (MOBIC) 7.5 MG TABLET    1 tablet    METHYLPREDNISOLONE (MEDROL DOSEPACK) 4 MG TABLET    Follow package directions    NYSTATIN (MYCOSTATIN) 146726 UNIT/ML SUSPENSION    Take 500,000 Units by mouth    OLMESARTAN (BENICAR) 20 MG TABLET    Take 20 mg by mouth every morning    OMEPRAZOLE (PRILOSEC) 20 MG DELAYED RELEASE CAPSULE    Take 20 mg by mouth daily    POLYETHYLENE GLYCOL (GLYCOLAX) 17 GM/SCOOP POWDER    527GM 1 SCOOP DAILY    PRAVASTATIN (PRAVACHOL) 40 MG TABLET    Take 40 mg by mouth    ZOLPIDEM (AMBIEN) 10 MG TABLET    Take by mouth. Vitals signs and nursing note reviewed. Patient Vitals for the past 4 hrs:   Temp Pulse Resp BP SpO2   09/06/22 1830 98.9 °F (37.2 °C) 76 16 (!) 189/90 98 %          Physical Exam  Vitals and nursing note reviewed. Constitutional:       General: She is not in acute distress. HENT:      Head: Normocephalic and atraumatic. Comments: No tenderness to percussion of the sinuses     Nose: Nose normal.      Mouth/Throat:      Mouth: Mucous membranes are moist.      Pharynx: Oropharynx is clear. Eyes:      Extraocular Movements: Extraocular movements intact. Conjunctiva/sclera: Conjunctivae normal.      Pupils: Pupils are equal, round, and reactive to light. Cardiovascular:      Rate and Rhythm: Normal rate and regular rhythm. Heart sounds: No murmur heard. Pulmonary:      Effort: Pulmonary effort is normal.      Breath sounds: Normal breath sounds.  No wheezing, rhonchi or rales. Abdominal:      General: Abdomen is flat. Palpations: There is no mass. Tenderness: There is no abdominal tenderness. There is no right CVA tenderness or left CVA tenderness. Musculoskeletal:         General: Normal range of motion. Cervical back: Normal range of motion and neck supple. No rigidity or tenderness. Lymphadenopathy:      Cervical: No cervical adenopathy. Skin:     General: Skin is warm and dry. Neurological:      General: No focal deficit present. Mental Status: She is alert and oriented to person, place, and time. Mental status is at baseline. Cranial Nerves: Cranial nerves are intact. No cranial nerve deficit. Sensory: Sensation is intact. No sensory deficit. Motor: Motor function is intact. No weakness or pronator drift. Coordination: Coordination is intact. Coordination normal.      Gait: Gait normal.      Deep Tendon Reflexes: Reflexes normal.   Psychiatric:         Mood and Affect: Affect normal. Mood is depressed. Speech: Speech normal.         Behavior: Behavior normal.         Thought Content: Thought content normal.         Cognition and Memory: Cognition and memory normal.        Procedures      Results Include:    No results found for this or any previous visit (from the past 24 hour(s)). CT HEAD WO CONTRAST    (Results Pending)        NIH Stroke Scale  Interval: Baseline  Level of Consciousness (1a): Alert  LOC Questions (1b): Answers both correctly  LOC Commands (1c): Performs both tasks correctly  Best Gaze (2): Normal  Visual (3): No visual loss  Facial Palsy (4): Normal symmetrical movement  Motor Arm, Left (5a): No drift  Motor Arm, Right (5b): No drift  Motor Leg, Left (6a): No drift  Motor Leg, Right (6b):  No drift  Limb Ataxia (7): Absent  Sensory (8): (!) Mild to Moderate  Best Language (9): No aphasia  Dysarthria (10): Normal  Extinction and Inattention (11): No abnormality  Total: 1 Voice dictation software was used during the making of this note. This software is not perfect and grammatical and other typographical errors may be present. This note has not been completely proofread for errors.      Maricel Preciado DO  09/06/22 2203

## 2022-09-06 NOTE — ED TRIAGE NOTES
Pt c/o constant headache that started Friday. Pt states she has taken Norco 10 this morning with some relief. Pt denies n/v/d, denies double or blurry vision. Pt also c/o dizziness and fatigue that started Saturday. Pt ambulatory to triage.

## 2022-09-07 NOTE — ED NOTES
I have reviewed discharge instructions with the patient and caregiver. The patient and caregiver verbalized understanding. Patient left ED via Discharge Method: ambulatory to Home with daughter. Opportunity for questions and clarification provided. Patient given 1 scripts. To continue your aftercare when you leave the hospital, you may receive an automated call from our care team to check in on how you are doing. This is a free service and part of our promise to provide the best care and service to meet your aftercare needs.  If you have questions, or wish to unsubscribe from this service please call [phone]. Thank you for Choosing our [facility] Emergency Department.       Ross Kirkpatrick RN  09/06/22 [id number]
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
## 2024-09-24 ENCOUNTER — APPOINTMENT (RX ONLY)
Dept: URBAN - METROPOLITAN AREA CLINIC 329 | Facility: CLINIC | Age: 79
Setting detail: DERMATOLOGY
End: 2024-09-24

## 2024-09-24 DIAGNOSIS — L82.1 OTHER SEBORRHEIC KERATOSIS: ICD-10-CM

## 2024-09-24 DIAGNOSIS — L81.5 LEUKODERMA, NOT ELSEWHERE CLASSIFIED: ICD-10-CM

## 2024-09-24 PROCEDURE — ? COUNSELING

## 2024-09-24 PROCEDURE — 99202 OFFICE O/P NEW SF 15 MIN: CPT

## 2024-09-24 PROCEDURE — ? FULL BODY SKIN EXAM - DECLINED

## 2024-09-24 ASSESSMENT — LOCATION SIMPLE DESCRIPTION DERM
LOCATION SIMPLE: RIGHT PRETIBIAL REGION
LOCATION SIMPLE: ABDOMEN
LOCATION SIMPLE: LEFT PRETIBIAL REGION

## 2024-09-24 ASSESSMENT — LOCATION ZONE DERM
LOCATION ZONE: TRUNK
LOCATION ZONE: LEG

## 2024-09-24 ASSESSMENT — LOCATION DETAILED DESCRIPTION DERM
LOCATION DETAILED: RIGHT PROXIMAL PRETIBIAL REGION
LOCATION DETAILED: LEFT RIB CAGE
LOCATION DETAILED: RIGHT DISTAL PRETIBIAL REGION
LOCATION DETAILED: LEFT PROXIMAL PRETIBIAL REGION

## 2025-08-15 ENCOUNTER — APPOINTMENT (OUTPATIENT)
Dept: CT IMAGING | Age: 80
End: 2025-08-15
Payer: MEDICARE

## 2025-08-15 ENCOUNTER — APPOINTMENT (OUTPATIENT)
Dept: GENERAL RADIOLOGY | Age: 80
End: 2025-08-15
Payer: MEDICARE

## 2025-08-15 ENCOUNTER — HOSPITAL ENCOUNTER (EMERGENCY)
Age: 80
Discharge: HOME | End: 2025-08-15
Payer: MEDICARE

## 2025-08-15 VITALS
HEART RATE: 67 BPM | BODY MASS INDEX: 22.15 KG/M2 | DIASTOLIC BLOOD PRESSURE: 105 MMHG | SYSTOLIC BLOOD PRESSURE: 184 MMHG | WEIGHT: 125 LBS | OXYGEN SATURATION: 98 % | RESPIRATION RATE: 18 BRPM | HEIGHT: 63 IN | TEMPERATURE: 98.6 F
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
## 2025-08-15 PROCEDURE — 99284 EMERGENCY DEPT VISIT MOD MDM: CPT

## 2025-08-15 PROCEDURE — 71101 X-RAY EXAM UNILAT RIBS/CHEST: CPT

## 2025-08-15 PROCEDURE — 73030 X-RAY EXAM OF SHOULDER: CPT

## 2025-08-15 PROCEDURE — 73090 X-RAY EXAM OF FOREARM: CPT

## 2025-08-15 PROCEDURE — 6370000000 HC RX 637 (ALT 250 FOR IP)

## 2025-08-15 PROCEDURE — 73600 X-RAY EXAM OF ANKLE: CPT

## 2025-08-15 PROCEDURE — 73060 X-RAY EXAM OF HUMERUS: CPT

## 2025-08-15 PROCEDURE — 70450 CT HEAD/BRAIN W/O DYE: CPT

## 2025-08-15 RX ORDER — HYDROCODONE BITARTRATE AND ACETAMINOPHEN 5; 325 MG/1; MG/1
1 TABLET ORAL
Refills: 0 | Status: COMPLETED | OUTPATIENT
Start: 2025-08-15 | End: 2025-08-15

## 2025-08-15 RX ADMIN — HYDROCODONE BITARTRATE AND ACETAMINOPHEN 1 TABLET: 5; 325 TABLET ORAL at 11:06

## 2025-08-15 ASSESSMENT — PAIN SCALES - GENERAL
PAINLEVEL_OUTOF10: 8
PAINLEVEL_OUTOF10: 9

## 2025-08-15 ASSESSMENT — PAIN DESCRIPTION - PAIN TYPE: TYPE: ACUTE PAIN

## 2025-08-15 ASSESSMENT — PAIN - FUNCTIONAL ASSESSMENT
PAIN_FUNCTIONAL_ASSESSMENT: 0-10
PAIN_FUNCTIONAL_ASSESSMENT: 0-10

## 2025-08-15 ASSESSMENT — PAIN DESCRIPTION - ORIENTATION
ORIENTATION: RIGHT;LEFT
ORIENTATION: RIGHT

## 2025-08-15 ASSESSMENT — ENCOUNTER SYMPTOMS: BACK PAIN: 0

## 2025-08-15 ASSESSMENT — PAIN DESCRIPTION - LOCATION
LOCATION: SHOULDER
LOCATION: SHOULDER;BUTTOCKS